# Patient Record
Sex: MALE | Race: WHITE | Employment: OTHER | URBAN - METROPOLITAN AREA
[De-identification: names, ages, dates, MRNs, and addresses within clinical notes are randomized per-mention and may not be internally consistent; named-entity substitution may affect disease eponyms.]

---

## 2018-01-24 PROBLEM — J44.1 COPD EXACERBATION (HCC): Status: ACTIVE | Noted: 2018-01-24

## 2018-03-01 ENCOUNTER — PAT TELEPHONE (OUTPATIENT)
Dept: PREADMISSION TESTING | Age: 83
End: 2018-03-01

## 2018-03-01 VITALS — BODY MASS INDEX: 33.04 KG/M2 | HEIGHT: 71 IN | WEIGHT: 236 LBS

## 2018-03-01 RX ORDER — ALBUTEROL SULFATE 2.5 MG/3ML
2.5 SOLUTION RESPIRATORY (INHALATION) EVERY 6 HOURS PRN
COMMUNITY

## 2018-03-01 RX ORDER — ALBUTEROL SULFATE 90 UG/1
1 AEROSOL, METERED RESPIRATORY (INHALATION)
COMMUNITY
Start: 2017-07-20 | End: 2018-03-01 | Stop reason: CLARIF

## 2018-03-01 RX ORDER — DOCUSATE SODIUM 100 MG/1
100 CAPSULE, LIQUID FILLED ORAL 2 TIMES DAILY
COMMUNITY

## 2018-03-01 NOTE — PRE-PROCEDURE INSTRUCTIONS
[address] [facility] time____900________        Surgery time___1030_________    Take the following medications with a sip of water:see epic    Do not eat or drink anything after 12:00 midnight prior to your surgery. This includes water chewing gum, mints and ice chips. You may brush your teeth and gargle the morning of your surgery, but do not swallow the water     Please see your family doctor/pediatrician for a history and physical and/or concerning medications. Bring any test results/reports from your physicians office. If you are under the care of a heart doctor or specialist doctor, please be aware that you may be asked to them for clearance    You may be asked to stop blood thinners such as Coumadin, Plavix, Fragmin, Lovenox, etc., or any anti-inflammatories such as:  Aspirin, Ibuprofen, Advil, Naproxen prior to your surgery. We also ask that you stop any OTC medications such as fish oil, vitamin E, glucosamine, garlic, Multivitamins, COQ 10, etc.    We ask that you do not smoke 24 hours prior to surgery  We ask that you do not  drink any alcoholic beverages 24 hours prior to surgery     You must make arrangements for a responsible adult to take you home after your surgery. For your safety you will not be allowed to leave alone or drive yourself home. Your surgery will be cancelled if you do not have a ride home. Also for your safety, it is strongly suggested that someone stay with you the first 24 hours after your surgery. A parent or legal guardian must accompany a child scheduled for surgery and plan to stay at the hospital until the child is discharged. Please do not bring other children with you. For your comfort, please wear simple loose fitting clothing to the hospital.  Please do not bring valuables.     Do not wear any make-up or nail polish on your fingers or toes      For your safety, please do not wear any jewelry or body piercing's

## 2018-03-07 ENCOUNTER — HOSPITAL ENCOUNTER (OUTPATIENT)
Dept: ENDOSCOPY | Age: 83
Discharge: HOME | End: 2018-03-07
Attending: INTERNAL MEDICINE | Admitting: INTERNAL MEDICINE

## 2018-03-07 VITALS
WEIGHT: 239.64 LBS | HEART RATE: 88 BPM | SYSTOLIC BLOOD PRESSURE: 116 MMHG | TEMPERATURE: 98 F | DIASTOLIC BLOOD PRESSURE: 74 MMHG | BODY MASS INDEX: 33.9 KG/M2 | RESPIRATION RATE: 14 BRPM | OXYGEN SATURATION: 98 %

## 2018-03-07 LAB
ANION GAP SERPL CALCULATED.3IONS-SCNC: 16 MMOL/L (ref 3–16)
BUN BLDV-MCNC: 18 MG/DL (ref 7–20)
CALCIUM SERPL-MCNC: 9.1 MG/DL (ref 8.3–10.6)
CHLORIDE BLD-SCNC: 100 MMOL/L (ref 99–110)
CO2: 24 MMOL/L (ref 21–32)
CREAT SERPL-MCNC: 1 MG/DL (ref 0.8–1.3)
GFR AFRICAN AMERICAN: >60
GFR NON-AFRICAN AMERICAN: >60
GLUCOSE BLD-MCNC: 153 MG/DL (ref 70–99)
GLUCOSE BLD-MCNC: 180 MG/DL (ref 70–99)
GLUCOSE BLD-MCNC: 181 MG/DL (ref 70–99)
HCT VFR BLD CALC: 39 % (ref 40.5–52.5)
HEMOGLOBIN: 13.6 G/DL (ref 13.5–17.5)
INR BLD: 1.06 (ref 0.85–1.15)
MCH RBC QN AUTO: 29.2 PG (ref 26–34)
MCHC RBC AUTO-ENTMCNC: 34.9 G/DL (ref 31–36)
MCV RBC AUTO: 83.8 FL (ref 80–100)
PDW BLD-RTO: 17.1 % (ref 12.4–15.4)
PERFORMED ON: ABNORMAL
PERFORMED ON: ABNORMAL
PLATELET # BLD: 139 K/UL (ref 135–450)
PMV BLD AUTO: 8.9 FL (ref 5–10.5)
POTASSIUM REFLEX MAGNESIUM: 4.4 MMOL/L (ref 3.5–5.1)
PROTHROMBIN TIME: 12 SEC (ref 9.6–13)
RBC # BLD: 4.65 M/UL (ref 4.2–5.9)
SODIUM BLD-SCNC: 140 MMOL/L (ref 136–145)
WBC # BLD: 6.1 K/UL (ref 4–11)

## 2018-03-07 RX ORDER — SODIUM CHLORIDE 9 MG/ML
INJECTION, SOLUTION INTRAVENOUS CONTINUOUS
Status: DISCONTINUED | OUTPATIENT
Start: 2018-03-07 | End: 2018-03-08 | Stop reason: HOSPADM

## 2018-03-07 RX ORDER — MEPERIDINE HYDROCHLORIDE 25 MG/ML
12.5 INJECTION INTRAMUSCULAR; INTRAVENOUS; SUBCUTANEOUS EVERY 5 MIN PRN
Status: DISCONTINUED | OUTPATIENT
Start: 2018-03-07 | End: 2018-03-08 | Stop reason: HOSPADM

## 2018-03-07 RX ORDER — METOPROLOL SUCCINATE 25 MG/1
100 TABLET, EXTENDED RELEASE ORAL DAILY
Status: DISCONTINUED | OUTPATIENT
Start: 2018-03-07 | End: 2018-03-08 | Stop reason: HOSPADM

## 2018-03-07 RX ORDER — OXYCODONE HYDROCHLORIDE 5 MG/1
10 TABLET ORAL PRN
Status: ACTIVE | OUTPATIENT
Start: 2018-03-07 | End: 2018-03-07

## 2018-03-07 RX ORDER — ONDANSETRON 2 MG/ML
4 INJECTION INTRAMUSCULAR; INTRAVENOUS
Status: ACTIVE | OUTPATIENT
Start: 2018-03-07 | End: 2018-03-07

## 2018-03-07 RX ORDER — MORPHINE SULFATE 2 MG/ML
1 INJECTION, SOLUTION INTRAMUSCULAR; INTRAVENOUS EVERY 5 MIN PRN
Status: DISCONTINUED | OUTPATIENT
Start: 2018-03-07 | End: 2018-03-08 | Stop reason: HOSPADM

## 2018-03-07 RX ORDER — METOPROLOL SUCCINATE 25 MG/1
25 TABLET, EXTENDED RELEASE ORAL DAILY
Status: DISCONTINUED | OUTPATIENT
Start: 2018-03-07 | End: 2018-03-07

## 2018-03-07 RX ORDER — FENTANYL CITRATE 50 UG/ML
25 INJECTION, SOLUTION INTRAMUSCULAR; INTRAVENOUS EVERY 5 MIN PRN
Status: DISCONTINUED | OUTPATIENT
Start: 2018-03-07 | End: 2018-03-08 | Stop reason: HOSPADM

## 2018-03-07 RX ORDER — FENTANYL CITRATE 50 UG/ML
50 INJECTION, SOLUTION INTRAMUSCULAR; INTRAVENOUS EVERY 5 MIN PRN
Status: DISCONTINUED | OUTPATIENT
Start: 2018-03-07 | End: 2018-03-08 | Stop reason: HOSPADM

## 2018-03-07 RX ORDER — MORPHINE SULFATE 2 MG/ML
2 INJECTION, SOLUTION INTRAMUSCULAR; INTRAVENOUS EVERY 5 MIN PRN
Status: DISCONTINUED | OUTPATIENT
Start: 2018-03-07 | End: 2018-03-08 | Stop reason: HOSPADM

## 2018-03-07 RX ORDER — SODIUM CHLORIDE 0.9 % (FLUSH) 0.9 %
10 SYRINGE (ML) INJECTION PRN
Status: DISCONTINUED | OUTPATIENT
Start: 2018-03-07 | End: 2018-03-08 | Stop reason: HOSPADM

## 2018-03-07 RX ORDER — OXYCODONE HYDROCHLORIDE 5 MG/1
5 TABLET ORAL PRN
Status: ACTIVE | OUTPATIENT
Start: 2018-03-07 | End: 2018-03-07

## 2018-03-07 RX ORDER — SODIUM CHLORIDE 0.9 % (FLUSH) 0.9 %
10 SYRINGE (ML) INJECTION EVERY 12 HOURS SCHEDULED
Status: DISCONTINUED | OUTPATIENT
Start: 2018-03-07 | End: 2018-03-08 | Stop reason: HOSPADM

## 2018-03-07 RX ADMIN — SODIUM CHLORIDE: 9 INJECTION, SOLUTION INTRAVENOUS at 09:45

## 2018-03-07 ASSESSMENT — LIFESTYLE VARIABLES: SMOKING_STATUS: 0

## 2018-03-07 ASSESSMENT — PAIN - FUNCTIONAL ASSESSMENT: PAIN_FUNCTIONAL_ASSESSMENT: 0-10

## 2018-03-07 ASSESSMENT — PAIN SCALES - GENERAL
PAINLEVEL_OUTOF10: 0
PAINLEVEL_OUTOF10: 0

## 2018-03-07 ASSESSMENT — ENCOUNTER SYMPTOMS: SHORTNESS OF BREATH: 0

## 2018-03-07 NOTE — H&P
Take 20 mg by mouth nightly       tamsulosin (FLOMAX) 0.4 MG capsule Take 0.4 mg by mouth nightly       warfarin (COUMADIN) 5 MG tablet Take 7.5 mg by mouth every evening Except 10 mg MWF      ferrous sulfate 325 (65 FE) MG tablet Take 325 mg by mouth daily (with breakfast)       zolpidem (AMBIEN) 5 MG tablet Take 5 mg by mouth nightly as needed for Sleep      ONETOUCH DELICA LANCETS 61V MISC by Does not apply route       No current facility-administered medications on file prior to encounter. Allergies:  No known allergies    Social History:      Social History     Social History    Marital status:      Spouse name: N/A    Number of children: N/A    Years of education: N/A     Occupational History    Not on file. Social History Main Topics    Smoking status: Former Smoker    Smokeless tobacco: Never Used      Comment: Quit 50 years ago    Alcohol use Yes      Comment: occ     Drug use: No    Sexual activity: Not on file     Other Topics Concern    Not on file     Social History Narrative    No narrative on file           Family History:   History reviewed. No pertinent family history. PHYSICAL EXAM:      /73   Pulse 114   Temp 97.1 °F (36.2 °C) (Temporal)   Resp 20   Wt 239 lb 10.2 oz (108.7 kg)   SpO2 99%   BMI 33.90 kg/m²  I        Heart: Normal    Lungs: Normal    Abdomen: Normal      ASA Grade: ASA 3 - Patient with moderate systemic disease with functional limitations    II (soft palate, uvula, fauces visible)  ASSESSMENT AND PLAN:    1. Patient is a 80 y.o. male here for Colonoscopy  2. Procedure options, risks and benefits reviewed with patient who expresses understanding.

## 2018-03-07 NOTE — PROGRESS NOTES
1041--Pt admitted to PACU from [facility]. Monitors placed. O2 on t 3l/nc. Asleep. Abd soft. Passing flatus per rectum.

## 2018-03-07 NOTE — ANESTHESIA PRE-OP
(ALDACTONE) 50 MG tablet Take 50 mg by mouth daily      potassium chloride (MICRO-K) 10 MEQ extended release capsule Take 20 mEq by mouth 2 times daily      folic acid (FOLVITE) 1 MG tablet Take 1 mg by mouth daily      mometasone-formoterol (DULERA) 200-5 MCG/ACT inhaler Inhale 2 puffs into the lungs every 12 hours      atorvastatin (LIPITOR) 20 MG tablet Take 20 mg by mouth nightly       tamsulosin (FLOMAX) 0.4 MG capsule Take 0.4 mg by mouth nightly       warfarin (COUMADIN) 5 MG tablet Take 7.5 mg by mouth every evening Except 10 mg MWF      ferrous sulfate 325 (65 FE) MG tablet Take 325 mg by mouth daily (with breakfast)       zolpidem (AMBIEN) 5 MG tablet Take 5 mg by mouth nightly as needed for Sleep      ONETOUCH DELICA LANCETS 71F MISC by Does not apply route       No current facility-administered medications on file prior to encounter.       Current Outpatient Prescriptions   Medication Sig Dispense Refill    Enoxaparin Sodium (LOVENOX IJ) Inject as directed      SITagliptin (JANUVIA) 50 MG tablet Take 50 mg by mouth daily      docusate sodium (COLACE) 100 MG capsule Take 100 mg by mouth 2 times daily      albuterol (PROVENTIL) (2.5 MG/3ML) 0.083% nebulizer solution Take 2.5 mg by nebulization every 6 hours as needed for Wheezing      predniSONE (DELTASONE) 10 MG tablet 4 tabs daily x 2 days, then 2 tabs daily x 2 days, then 1 tab daily x 2 days, then STOP 14 tablet 0    metoprolol succinate (TOPROL XL) 100 MG extended release tablet Take 100 mg by mouth daily      Probiotic Product (PROBIOTIC-10 PO) Take 1 tablet by mouth daily      torsemide (DEMADEX) 20 MG tablet Take 40 mg by mouth daily       spironolactone (ALDACTONE) 50 MG tablet Take 50 mg by mouth daily      potassium chloride (MICRO-K) 10 MEQ extended release capsule Take 20 mEq by mouth 2 times daily      folic acid (FOLVITE) 1 MG tablet Take 1 mg by mouth daily      mometasone-formoterol (DULERA) 200-5 MCG/ACT inhaler Inhale 2 puffs into the lungs every 12 hours      atorvastatin (LIPITOR) 20 MG tablet Take 20 mg by mouth nightly       tamsulosin (FLOMAX) 0.4 MG capsule Take 0.4 mg by mouth nightly       warfarin (COUMADIN) 5 MG tablet Take 7.5 mg by mouth every evening Except 10 mg MWF      ferrous sulfate 325 (65 FE) MG tablet Take 325 mg by mouth daily (with breakfast)       zolpidem (AMBIEN) 5 MG tablet Take 5 mg by mouth nightly as needed for Sleep      ONETOUCH DELICA LANCETS 31W MISC by Does not apply route       Current Facility-Administered Medications   Medication Dose Route Frequency Provider Last Rate Last Dose    0.9 % sodium chloride infusion   Intravenous Continuous Micahel Peña MD        sodium chloride flush 0.9 % injection 10 mL  10 mL Intravenous 2 times per day Michael Peña MD        sodium chloride flush 0.9 % injection 10 mL  10 mL Intravenous PRN Michael Peña MD        famotidine (PEPCID) injection 20 mg  20 mg Intravenous Once Michael Peña MD         Vital Signs (Current)   Vitals:    18   BP: 134/73   Pulse: 114   Resp: 20   Temp: 97.1 °F (36.2 °C)   SpO2: 99%     Vital Signs Statistics (for past 48 hrs)     Temp  Av.1 °F (36.2 °C)  Min: 97.1 °F (36.2 °C)   Min taken time: 18  Max: 97.1 °F (36.2 °C)   Max taken time: 18  Pulse  Av  Min: 114   Min taken time: 18  Max: 114   Max taken time: 18  Resp  Av  Min: 20   Min taken time: 18  Max: 20   Max taken time: 18  BP  Min: 134/73   Min taken time: 18  Max: 134/73   Max taken time: 18  SpO2  Av %  Min: 99 %   Min taken time: 18  Max: 99 %   Max taken time: 18    BP Readings from Last 3 Encounters:   18 134/73   18 130/73   17 103/73     BMI  There is no height or weight on file to calculate BMI.   Estimated body mass index is 33.38 kg/m² as calculated from the following:    Height as of 3/1/18: 5' 10.5\" (1.791 m). Weight as of 3/1/18: 236 lb (107 kg). CBC   Lab Results   Component Value Date    WBC 10.2 01/27/2018    RBC 4.03 01/27/2018    HGB 11.7 01/27/2018    HCT 34.6 01/27/2018    MCV 85.9 01/27/2018    RDW 16.1 01/27/2018     01/27/2018     CMP    Lab Results   Component Value Date     01/27/2018    K 4.4 01/27/2018    K 4.1 01/24/2018     01/27/2018    CO2 24 01/27/2018    BUN 35 01/27/2018    CREATININE 1.1 01/27/2018    GFRAA >60 01/27/2018    GFRAA >60 12/20/2012    AGRATIO 1.5 06/04/2017    LABGLOM >60 01/27/2018    GLUCOSE 179 01/27/2018    PROT 7.5 06/04/2017    PROT 6.7 12/20/2012    CALCIUM 8.9 01/27/2018    BILITOT 0.5 06/04/2017    ALKPHOS 89 06/04/2017    AST 24 06/04/2017    ALT 20 06/04/2017     BMP    Lab Results   Component Value Date     01/27/2018    K 4.4 01/27/2018    K 4.1 01/24/2018     01/27/2018    CO2 24 01/27/2018    BUN 35 01/27/2018    CREATININE 1.1 01/27/2018    CALCIUM 8.9 01/27/2018    GFRAA >60 01/27/2018    GFRAA >60 12/20/2012    LABGLOM >60 01/27/2018    GLUCOSE 179 01/27/2018     POCGlucose  No results for input(s): GLUCOSE in the last 72 hours.    Coags    Lab Results   Component Value Date    PROTIME 25.6 01/27/2018    INR 2.27 01/27/2018    APTT 28.6 96/85/0459     HCG (If Applicable) No results found for: PREGTESTUR, PREGSERUM, HCG, HCGQUANT   ABGs No results found for: PHART, PO2ART, EDF9RVJ, ADA5JTW, BEART, K4KHTRAD   Type & Screen (If Applicable)  No results found for: LABABO, LABRH                         BMI: Wt Readings from Last 3 Encounters:       NPO Status:   Date of last liquid consumption: 03/07/18   Time of last liquid consumption: 0530   Date of last solid food consumption: 03/05/18              Anesthesia Evaluation  Patient summary reviewed and Nursing notes reviewed no history of anesthetic complications:   Airway: Mallampati: II  TM distance: >3 FB   Neck ROM: full  Mouth opening: > = 3 FB Dental:

## 2019-12-14 ENCOUNTER — APPOINTMENT (OUTPATIENT)
Dept: GENERAL RADIOLOGY | Age: 84
DRG: 315 | End: 2019-12-14
Payer: MEDICARE

## 2019-12-14 ENCOUNTER — HOSPITAL ENCOUNTER (INPATIENT)
Age: 84
LOS: 2 days | Discharge: HOME | DRG: 315 | End: 2019-12-17
Attending: EMERGENCY MEDICINE | Admitting: HOSPITALIST
Payer: MEDICARE

## 2019-12-14 DIAGNOSIS — R06.09 EXERTIONAL DYSPNEA: ICD-10-CM

## 2019-12-14 DIAGNOSIS — I24.9 CORONARY SYNDROME, ACUTE (HCC): Primary | ICD-10-CM

## 2019-12-14 DIAGNOSIS — N17.9 AKI (ACUTE KIDNEY INJURY) (HCC): ICD-10-CM

## 2019-12-14 PROBLEM — R07.9 CHEST PAIN: Status: ACTIVE | Noted: 2019-12-14

## 2019-12-14 LAB
ANION GAP SERPL CALCULATED.3IONS-SCNC: 14 MMOL/L (ref 3–16)
BASOPHILS ABSOLUTE: 0 K/UL (ref 0–0.2)
BASOPHILS RELATIVE PERCENT: 0.5 %
BUN BLDV-MCNC: 33 MG/DL (ref 7–20)
CALCIUM SERPL-MCNC: 9.4 MG/DL (ref 8.3–10.6)
CHLORIDE BLD-SCNC: 104 MMOL/L (ref 99–110)
CO2: 23 MMOL/L (ref 21–32)
CREAT SERPL-MCNC: 1.6 MG/DL (ref 0.8–1.3)
EOSINOPHILS ABSOLUTE: 0.1 K/UL (ref 0–0.6)
EOSINOPHILS RELATIVE PERCENT: 0.7 %
GFR AFRICAN AMERICAN: 50
GFR NON-AFRICAN AMERICAN: 41
GLUCOSE BLD-MCNC: 180 MG/DL (ref 70–99)
HCT VFR BLD CALC: 40.6 % (ref 40.5–52.5)
HEMOGLOBIN: 13.6 G/DL (ref 13.5–17.5)
LYMPHOCYTES ABSOLUTE: 0.9 K/UL (ref 1–5.1)
LYMPHOCYTES RELATIVE PERCENT: 10.1 %
MCH RBC QN AUTO: 28.9 PG (ref 26–34)
MCHC RBC AUTO-ENTMCNC: 33.6 G/DL (ref 31–36)
MCV RBC AUTO: 86.2 FL (ref 80–100)
MONOCYTES ABSOLUTE: 0.7 K/UL (ref 0–1.3)
MONOCYTES RELATIVE PERCENT: 7.5 %
NEUTROPHILS ABSOLUTE: 7.2 K/UL (ref 1.7–7.7)
NEUTROPHILS RELATIVE PERCENT: 81.2 %
PDW BLD-RTO: 15.3 % (ref 12.4–15.4)
PLATELET # BLD: 134 K/UL (ref 135–450)
PMV BLD AUTO: 9.5 FL (ref 5–10.5)
POTASSIUM REFLEX MAGNESIUM: 5.1 MMOL/L (ref 3.5–5.1)
PRO-BNP: 702 PG/ML (ref 0–449)
RBC # BLD: 4.71 M/UL (ref 4.2–5.9)
SODIUM BLD-SCNC: 141 MMOL/L (ref 136–145)
TROPONIN: <0.01 NG/ML
TROPONIN: <0.01 NG/ML
WBC # BLD: 8.9 K/UL (ref 4–11)

## 2019-12-14 PROCEDURE — 83880 ASSAY OF NATRIURETIC PEPTIDE: CPT

## 2019-12-14 PROCEDURE — 36415 COLL VENOUS BLD VENIPUNCTURE: CPT

## 2019-12-14 PROCEDURE — 71046 X-RAY EXAM CHEST 2 VIEWS: CPT

## 2019-12-14 PROCEDURE — 2580000003 HC RX 258: Performed by: HOSPITALIST

## 2019-12-14 PROCEDURE — 93005 ELECTROCARDIOGRAM TRACING: CPT | Performed by: EMERGENCY MEDICINE

## 2019-12-14 PROCEDURE — 6370000000 HC RX 637 (ALT 250 FOR IP): Performed by: HOSPITALIST

## 2019-12-14 PROCEDURE — 80048 BASIC METABOLIC PNL TOTAL CA: CPT

## 2019-12-14 PROCEDURE — G0378 HOSPITAL OBSERVATION PER HR: HCPCS

## 2019-12-14 PROCEDURE — 99285 EMERGENCY DEPT VISIT HI MDM: CPT

## 2019-12-14 PROCEDURE — 96374 THER/PROPH/DIAG INJ IV PUSH: CPT

## 2019-12-14 PROCEDURE — 6360000002 HC RX W HCPCS: Performed by: HOSPITALIST

## 2019-12-14 PROCEDURE — 85025 COMPLETE CBC W/AUTO DIFF WBC: CPT

## 2019-12-14 PROCEDURE — 84484 ASSAY OF TROPONIN QUANT: CPT

## 2019-12-14 PROCEDURE — 83036 HEMOGLOBIN GLYCOSYLATED A1C: CPT

## 2019-12-14 RX ORDER — DEXTROSE MONOHYDRATE 25 G/50ML
12.5 INJECTION, SOLUTION INTRAVENOUS PRN
Status: DISCONTINUED | OUTPATIENT
Start: 2019-12-14 | End: 2019-12-17 | Stop reason: HOSPADM

## 2019-12-14 RX ORDER — SPIRONOLACTONE 25 MG/1
50 TABLET ORAL DAILY
Status: CANCELLED | OUTPATIENT
Start: 2019-12-14

## 2019-12-14 RX ORDER — TAMSULOSIN HYDROCHLORIDE 0.4 MG/1
0.4 CAPSULE ORAL NIGHTLY
Status: DISCONTINUED | OUTPATIENT
Start: 2019-12-14 | End: 2019-12-17 | Stop reason: HOSPADM

## 2019-12-14 RX ORDER — METOPROLOL SUCCINATE 50 MG/1
100 TABLET, EXTENDED RELEASE ORAL DAILY
Status: DISCONTINUED | OUTPATIENT
Start: 2019-12-15 | End: 2019-12-14

## 2019-12-14 RX ORDER — NICOTINE POLACRILEX 4 MG
15 LOZENGE BUCCAL PRN
Status: DISCONTINUED | OUTPATIENT
Start: 2019-12-14 | End: 2019-12-17 | Stop reason: HOSPADM

## 2019-12-14 RX ORDER — FERROUS SULFATE TAB EC 324 MG (65 MG FE EQUIVALENT) 324 (65 FE) MG
325 TABLET DELAYED RESPONSE ORAL
Status: DISCONTINUED | OUTPATIENT
Start: 2019-12-15 | End: 2019-12-17 | Stop reason: HOSPADM

## 2019-12-14 RX ORDER — METOPROLOL SUCCINATE 50 MG/1
200 TABLET, EXTENDED RELEASE ORAL DAILY
Status: DISCONTINUED | OUTPATIENT
Start: 2019-12-15 | End: 2019-12-17 | Stop reason: HOSPADM

## 2019-12-14 RX ORDER — SODIUM CHLORIDE 0.9 % (FLUSH) 0.9 %
10 SYRINGE (ML) INJECTION PRN
Status: DISCONTINUED | OUTPATIENT
Start: 2019-12-14 | End: 2019-12-17 | Stop reason: HOSPADM

## 2019-12-14 RX ORDER — SODIUM CHLORIDE 0.9 % (FLUSH) 0.9 %
10 SYRINGE (ML) INJECTION EVERY 12 HOURS SCHEDULED
Status: DISCONTINUED | OUTPATIENT
Start: 2019-12-14 | End: 2019-12-17 | Stop reason: HOSPADM

## 2019-12-14 RX ORDER — ZOLPIDEM TARTRATE 5 MG/1
5 TABLET ORAL NIGHTLY PRN
Status: DISCONTINUED | OUTPATIENT
Start: 2019-12-14 | End: 2019-12-14 | Stop reason: ALTCHOICE

## 2019-12-14 RX ORDER — ASPIRIN 81 MG/1
81 TABLET, CHEWABLE ORAL DAILY
Status: DISCONTINUED | OUTPATIENT
Start: 2019-12-14 | End: 2019-12-17 | Stop reason: HOSPADM

## 2019-12-14 RX ORDER — DEXTROSE MONOHYDRATE 50 MG/ML
100 INJECTION, SOLUTION INTRAVENOUS PRN
Status: DISCONTINUED | OUTPATIENT
Start: 2019-12-14 | End: 2019-12-17 | Stop reason: HOSPADM

## 2019-12-14 RX ORDER — ALBUTEROL SULFATE 2.5 MG/3ML
2.5 SOLUTION RESPIRATORY (INHALATION) EVERY 6 HOURS PRN
Status: DISCONTINUED | OUTPATIENT
Start: 2019-12-14 | End: 2019-12-17 | Stop reason: HOSPADM

## 2019-12-14 RX ORDER — WARFARIN SODIUM 7.5 MG/1
7.5 TABLET ORAL EVERY EVENING
Status: DISCONTINUED | OUTPATIENT
Start: 2019-12-14 | End: 2019-12-14

## 2019-12-14 RX ORDER — FOLIC ACID 1 MG/1
1 TABLET ORAL DAILY
Status: DISCONTINUED | OUTPATIENT
Start: 2019-12-15 | End: 2019-12-17 | Stop reason: HOSPADM

## 2019-12-14 RX ORDER — DOCUSATE SODIUM 100 MG/1
100 CAPSULE, LIQUID FILLED ORAL 2 TIMES DAILY
Status: DISCONTINUED | OUTPATIENT
Start: 2019-12-14 | End: 2019-12-17 | Stop reason: HOSPADM

## 2019-12-14 RX ORDER — FAMOTIDINE 20 MG/1
20 TABLET, FILM COATED ORAL DAILY
Status: DISCONTINUED | OUTPATIENT
Start: 2019-12-15 | End: 2019-12-17 | Stop reason: HOSPADM

## 2019-12-14 RX ORDER — ATORVASTATIN CALCIUM 20 MG/1
20 TABLET, FILM COATED ORAL NIGHTLY
Status: DISCONTINUED | OUTPATIENT
Start: 2019-12-14 | End: 2019-12-17 | Stop reason: HOSPADM

## 2019-12-14 RX ORDER — POTASSIUM CHLORIDE 750 MG/1
20 TABLET, FILM COATED, EXTENDED RELEASE ORAL 2 TIMES DAILY
Status: DISCONTINUED | OUTPATIENT
Start: 2019-12-14 | End: 2019-12-17 | Stop reason: HOSPADM

## 2019-12-14 RX ORDER — FUROSEMIDE 10 MG/ML
40 INJECTION INTRAMUSCULAR; INTRAVENOUS 2 TIMES DAILY
Status: DISCONTINUED | OUTPATIENT
Start: 2019-12-14 | End: 2019-12-15

## 2019-12-14 RX ORDER — ONDANSETRON 2 MG/ML
4 INJECTION INTRAMUSCULAR; INTRAVENOUS EVERY 6 HOURS PRN
Status: DISCONTINUED | OUTPATIENT
Start: 2019-12-14 | End: 2019-12-17 | Stop reason: HOSPADM

## 2019-12-14 RX ADMIN — DOCUSATE SODIUM 100 MG: 100 CAPSULE, LIQUID FILLED ORAL at 23:40

## 2019-12-14 RX ADMIN — FUROSEMIDE 40 MG: 10 INJECTION, SOLUTION INTRAMUSCULAR; INTRAVENOUS at 20:50

## 2019-12-14 RX ADMIN — SODIUM CHLORIDE, PRESERVATIVE FREE 10 ML: 5 INJECTION INTRAVENOUS at 23:44

## 2019-12-14 RX ADMIN — TAMSULOSIN HYDROCHLORIDE 0.4 MG: 0.4 CAPSULE ORAL at 23:41

## 2019-12-14 RX ADMIN — ATORVASTATIN CALCIUM 20 MG: 20 TABLET, FILM COATED ORAL at 23:41

## 2019-12-14 RX ADMIN — POTASSIUM CHLORIDE 20 MEQ: 750 TABLET, FILM COATED, EXTENDED RELEASE ORAL at 23:41

## 2019-12-14 ASSESSMENT — PAIN SCALES - GENERAL
PAINLEVEL_OUTOF10: 0

## 2019-12-15 PROBLEM — I50.33 ACUTE ON CHRONIC DIASTOLIC CHF (CONGESTIVE HEART FAILURE) (HCC): Status: ACTIVE | Noted: 2019-12-15

## 2019-12-15 LAB
A/G RATIO: 1.6 (ref 1.1–2.2)
ALBUMIN SERPL-MCNC: 4 G/DL (ref 3.4–5)
ALP BLD-CCNC: 69 U/L (ref 40–129)
ALT SERPL-CCNC: 35 U/L (ref 10–40)
ANION GAP SERPL CALCULATED.3IONS-SCNC: 14 MMOL/L (ref 3–16)
AST SERPL-CCNC: 27 U/L (ref 15–37)
BILIRUB SERPL-MCNC: 0.7 MG/DL (ref 0–1)
BUN BLDV-MCNC: 32 MG/DL (ref 7–20)
CALCIUM SERPL-MCNC: 8.8 MG/DL (ref 8.3–10.6)
CHLORIDE BLD-SCNC: 100 MMOL/L (ref 99–110)
CHOLESTEROL, TOTAL: 69 MG/DL (ref 0–199)
CO2: 26 MMOL/L (ref 21–32)
CREAT SERPL-MCNC: 1.3 MG/DL (ref 0.8–1.3)
ESTIMATED AVERAGE GLUCOSE: 151.3 MG/DL
ESTIMATED AVERAGE GLUCOSE: 154.2 MG/DL
GFR AFRICAN AMERICAN: >60
GFR NON-AFRICAN AMERICAN: 52
GLOBULIN: 2.5 G/DL
GLUCOSE BLD-MCNC: 141 MG/DL (ref 70–99)
GLUCOSE BLD-MCNC: 146 MG/DL (ref 70–99)
GLUCOSE BLD-MCNC: 154 MG/DL (ref 70–99)
GLUCOSE BLD-MCNC: 160 MG/DL (ref 70–99)
GLUCOSE BLD-MCNC: 186 MG/DL (ref 70–99)
HBA1C MFR BLD: 6.9 %
HBA1C MFR BLD: 7 %
HCT VFR BLD CALC: 38.4 % (ref 40.5–52.5)
HDLC SERPL-MCNC: 35 MG/DL (ref 40–60)
HEMOGLOBIN: 13.1 G/DL (ref 13.5–17.5)
INR BLD: 2.44 (ref 0.86–1.14)
LDL CHOLESTEROL CALCULATED: 9 MG/DL
MAGNESIUM: 2.4 MG/DL (ref 1.8–2.4)
MCH RBC QN AUTO: 29.2 PG (ref 26–34)
MCHC RBC AUTO-ENTMCNC: 34.1 G/DL (ref 31–36)
MCV RBC AUTO: 85.5 FL (ref 80–100)
PDW BLD-RTO: 15.7 % (ref 12.4–15.4)
PERFORMED ON: ABNORMAL
PLATELET # BLD: 121 K/UL (ref 135–450)
PMV BLD AUTO: 9.5 FL (ref 5–10.5)
POTASSIUM REFLEX MAGNESIUM: 4.1 MMOL/L (ref 3.5–5.1)
PROTHROMBIN TIME: 28.6 SEC (ref 10–13.2)
RBC # BLD: 4.49 M/UL (ref 4.2–5.9)
SODIUM BLD-SCNC: 140 MMOL/L (ref 136–145)
TOTAL PROTEIN: 6.5 G/DL (ref 6.4–8.2)
TRIGL SERPL-MCNC: 123 MG/DL (ref 0–150)
TROPONIN: <0.01 NG/ML
VLDLC SERPL CALC-MCNC: 25 MG/DL
WBC # BLD: 8.4 K/UL (ref 4–11)

## 2019-12-15 PROCEDURE — 99223 1ST HOSP IP/OBS HIGH 75: CPT | Performed by: INTERNAL MEDICINE

## 2019-12-15 PROCEDURE — 6370000000 HC RX 637 (ALT 250 FOR IP): Performed by: NURSE PRACTITIONER

## 2019-12-15 PROCEDURE — 6360000002 HC RX W HCPCS: Performed by: INTERNAL MEDICINE

## 2019-12-15 PROCEDURE — 6370000000 HC RX 637 (ALT 250 FOR IP): Performed by: INTERNAL MEDICINE

## 2019-12-15 PROCEDURE — 84484 ASSAY OF TROPONIN QUANT: CPT

## 2019-12-15 PROCEDURE — 80061 LIPID PANEL: CPT

## 2019-12-15 PROCEDURE — 83735 ASSAY OF MAGNESIUM: CPT

## 2019-12-15 PROCEDURE — 2580000003 HC RX 258: Performed by: HOSPITALIST

## 2019-12-15 PROCEDURE — G0378 HOSPITAL OBSERVATION PER HR: HCPCS

## 2019-12-15 PROCEDURE — 80053 COMPREHEN METABOLIC PANEL: CPT

## 2019-12-15 PROCEDURE — 1200000000 HC SEMI PRIVATE

## 2019-12-15 PROCEDURE — 85610 PROTHROMBIN TIME: CPT

## 2019-12-15 PROCEDURE — 94760 N-INVAS EAR/PLS OXIMETRY 1: CPT

## 2019-12-15 PROCEDURE — 93005 ELECTROCARDIOGRAM TRACING: CPT | Performed by: HOSPITALIST

## 2019-12-15 PROCEDURE — 85027 COMPLETE CBC AUTOMATED: CPT

## 2019-12-15 PROCEDURE — 97161 PT EVAL LOW COMPLEX 20 MIN: CPT

## 2019-12-15 PROCEDURE — 36415 COLL VENOUS BLD VENIPUNCTURE: CPT

## 2019-12-15 PROCEDURE — 94664 DEMO&/EVAL PT USE INHALER: CPT

## 2019-12-15 PROCEDURE — 83036 HEMOGLOBIN GLYCOSYLATED A1C: CPT

## 2019-12-15 PROCEDURE — 6370000000 HC RX 637 (ALT 250 FOR IP): Performed by: HOSPITALIST

## 2019-12-15 PROCEDURE — 94640 AIRWAY INHALATION TREATMENT: CPT

## 2019-12-15 RX ORDER — WARFARIN SODIUM 7.5 MG/1
7.5 TABLET ORAL
Status: COMPLETED | OUTPATIENT
Start: 2019-12-15 | End: 2019-12-15

## 2019-12-15 RX ORDER — FUROSEMIDE 10 MG/ML
60 INJECTION INTRAMUSCULAR; INTRAVENOUS 2 TIMES DAILY
Status: DISCONTINUED | OUTPATIENT
Start: 2019-12-15 | End: 2019-12-16

## 2019-12-15 RX ADMIN — ATORVASTATIN CALCIUM 20 MG: 20 TABLET, FILM COATED ORAL at 20:15

## 2019-12-15 RX ADMIN — DOCUSATE SODIUM 100 MG: 100 CAPSULE, LIQUID FILLED ORAL at 09:32

## 2019-12-15 RX ADMIN — SODIUM CHLORIDE, PRESERVATIVE FREE 10 ML: 5 INJECTION INTRAVENOUS at 09:28

## 2019-12-15 RX ADMIN — POTASSIUM CHLORIDE 20 MEQ: 750 TABLET, FILM COATED, EXTENDED RELEASE ORAL at 09:31

## 2019-12-15 RX ADMIN — MOMETASONE FUROATE AND FORMOTEROL FUMARATE DIHYDRATE 2 PUFF: 200; 5 AEROSOL RESPIRATORY (INHALATION) at 20:36

## 2019-12-15 RX ADMIN — FAMOTIDINE 20 MG: 20 TABLET ORAL at 09:32

## 2019-12-15 RX ADMIN — FUROSEMIDE 60 MG: 10 INJECTION, SOLUTION INTRAMUSCULAR; INTRAVENOUS at 09:32

## 2019-12-15 RX ADMIN — POTASSIUM CHLORIDE 20 MEQ: 750 TABLET, FILM COATED, EXTENDED RELEASE ORAL at 20:15

## 2019-12-15 RX ADMIN — INSULIN LISPRO 1 UNITS: 100 INJECTION, SOLUTION INTRAVENOUS; SUBCUTANEOUS at 17:07

## 2019-12-15 RX ADMIN — FERROUS SULFATE TAB EC 324 MG (65 MG FE EQUIVALENT) 325 MG: 324 (65 FE) TABLET DELAYED RESPONSE at 09:32

## 2019-12-15 RX ADMIN — INSULIN LISPRO 1 UNITS: 100 INJECTION, SOLUTION INTRAVENOUS; SUBCUTANEOUS at 12:52

## 2019-12-15 RX ADMIN — INSULIN LISPRO 1 UNITS: 100 INJECTION, SOLUTION INTRAVENOUS; SUBCUTANEOUS at 09:32

## 2019-12-15 RX ADMIN — WARFARIN SODIUM 7.5 MG: 7.5 TABLET ORAL at 17:07

## 2019-12-15 RX ADMIN — METOPROLOL SUCCINATE 200 MG: 50 TABLET, EXTENDED RELEASE ORAL at 09:32

## 2019-12-15 RX ADMIN — ASPIRIN 81 MG 81 MG: 81 TABLET ORAL at 09:32

## 2019-12-15 RX ADMIN — FOLIC ACID 1 MG: 1 TABLET ORAL at 09:32

## 2019-12-15 RX ADMIN — SODIUM CHLORIDE, PRESERVATIVE FREE 10 ML: 5 INJECTION INTRAVENOUS at 20:15

## 2019-12-15 RX ADMIN — MOMETASONE FUROATE AND FORMOTEROL FUMARATE DIHYDRATE 2 PUFF: 200; 5 AEROSOL RESPIRATORY (INHALATION) at 08:01

## 2019-12-15 RX ADMIN — TAMSULOSIN HYDROCHLORIDE 0.4 MG: 0.4 CAPSULE ORAL at 20:15

## 2019-12-15 RX ADMIN — DOCUSATE SODIUM 100 MG: 100 CAPSULE, LIQUID FILLED ORAL at 20:15

## 2019-12-15 RX ADMIN — FUROSEMIDE 60 MG: 10 INJECTION, SOLUTION INTRAMUSCULAR; INTRAVENOUS at 17:07

## 2019-12-15 ASSESSMENT — PAIN SCALES - GENERAL
PAINLEVEL_OUTOF10: 0

## 2019-12-16 LAB
A/G RATIO: 1.6 (ref 1.1–2.2)
ALBUMIN SERPL-MCNC: 4 G/DL (ref 3.4–5)
ALP BLD-CCNC: 69 U/L (ref 40–129)
ALT SERPL-CCNC: 29 U/L (ref 10–40)
ANION GAP SERPL CALCULATED.3IONS-SCNC: 15 MMOL/L (ref 3–16)
AST SERPL-CCNC: 20 U/L (ref 15–37)
BASOPHILS ABSOLUTE: 0 K/UL (ref 0–0.2)
BASOPHILS RELATIVE PERCENT: 0.6 %
BILIRUB SERPL-MCNC: 0.8 MG/DL (ref 0–1)
BUN BLDV-MCNC: 30 MG/DL (ref 7–20)
CALCIUM SERPL-MCNC: 8.5 MG/DL (ref 8.3–10.6)
CHLORIDE BLD-SCNC: 102 MMOL/L (ref 99–110)
CO2: 24 MMOL/L (ref 21–32)
CREAT SERPL-MCNC: 1.3 MG/DL (ref 0.8–1.3)
EKG ATRIAL RATE: 65 BPM
EKG ATRIAL RATE: 76 BPM
EKG DIAGNOSIS: NORMAL
EKG DIAGNOSIS: NORMAL
EKG Q-T INTERVAL: 456 MS
EKG Q-T INTERVAL: 466 MS
EKG QRS DURATION: 146 MS
EKG QRS DURATION: 152 MS
EKG QTC CALCULATION (BAZETT): 492 MS
EKG QTC CALCULATION (BAZETT): 513 MS
EKG R AXIS: 49 DEGREES
EKG R AXIS: 77 DEGREES
EKG T AXIS: -2 DEGREES
EKG T AXIS: 2 DEGREES
EKG VENTRICULAR RATE: 67 BPM
EKG VENTRICULAR RATE: 76 BPM
EOSINOPHILS ABSOLUTE: 0.1 K/UL (ref 0–0.6)
EOSINOPHILS RELATIVE PERCENT: 1.1 %
GFR AFRICAN AMERICAN: >60
GFR NON-AFRICAN AMERICAN: 52
GLOBULIN: 2.5 G/DL
GLUCOSE BLD-MCNC: 130 MG/DL (ref 70–99)
GLUCOSE BLD-MCNC: 149 MG/DL (ref 70–99)
GLUCOSE BLD-MCNC: 152 MG/DL (ref 70–99)
GLUCOSE BLD-MCNC: 197 MG/DL (ref 70–99)
HCT VFR BLD CALC: 38.8 % (ref 40.5–52.5)
HEMOGLOBIN: 13 G/DL (ref 13.5–17.5)
INR BLD: 2.21 (ref 0.86–1.14)
LV EF: 58 %
LV EF: 73 %
LVEF MODALITY: NORMAL
LVEF MODALITY: NORMAL
LYMPHOCYTES ABSOLUTE: 1.2 K/UL (ref 1–5.1)
LYMPHOCYTES RELATIVE PERCENT: 16.2 %
MCH RBC QN AUTO: 28.8 PG (ref 26–34)
MCHC RBC AUTO-ENTMCNC: 33.5 G/DL (ref 31–36)
MCV RBC AUTO: 85.8 FL (ref 80–100)
MONOCYTES ABSOLUTE: 0.7 K/UL (ref 0–1.3)
MONOCYTES RELATIVE PERCENT: 9.8 %
NEUTROPHILS ABSOLUTE: 5.3 K/UL (ref 1.7–7.7)
NEUTROPHILS RELATIVE PERCENT: 72.3 %
PDW BLD-RTO: 15.7 % (ref 12.4–15.4)
PERFORMED ON: ABNORMAL
PLATELET # BLD: 116 K/UL (ref 135–450)
PMV BLD AUTO: 9.4 FL (ref 5–10.5)
POTASSIUM REFLEX MAGNESIUM: 3.8 MMOL/L (ref 3.5–5.1)
PROTHROMBIN TIME: 25.9 SEC (ref 10–13.2)
RBC # BLD: 4.52 M/UL (ref 4.2–5.9)
REPORT: NORMAL
RESPIRATORY PANEL PCR: NORMAL
SODIUM BLD-SCNC: 141 MMOL/L (ref 136–145)
TOTAL PROTEIN: 6.5 G/DL (ref 6.4–8.2)
WBC # BLD: 7.3 K/UL (ref 4–11)

## 2019-12-16 PROCEDURE — 6360000002 HC RX W HCPCS: Performed by: INTERNAL MEDICINE

## 2019-12-16 PROCEDURE — 85025 COMPLETE CBC W/AUTO DIFF WBC: CPT

## 2019-12-16 PROCEDURE — A9502 TC99M TETROFOSMIN: HCPCS | Performed by: INTERNAL MEDICINE

## 2019-12-16 PROCEDURE — 36415 COLL VENOUS BLD VENIPUNCTURE: CPT

## 2019-12-16 PROCEDURE — 6370000000 HC RX 637 (ALT 250 FOR IP): Performed by: NURSE PRACTITIONER

## 2019-12-16 PROCEDURE — 93010 ELECTROCARDIOGRAM REPORT: CPT | Performed by: INTERNAL MEDICINE

## 2019-12-16 PROCEDURE — 1200000000 HC SEMI PRIVATE

## 2019-12-16 PROCEDURE — 94760 N-INVAS EAR/PLS OXIMETRY 1: CPT

## 2019-12-16 PROCEDURE — 80053 COMPREHEN METABOLIC PANEL: CPT

## 2019-12-16 PROCEDURE — 2580000003 HC RX 258: Performed by: HOSPITALIST

## 2019-12-16 PROCEDURE — 85610 PROTHROMBIN TIME: CPT

## 2019-12-16 PROCEDURE — 93306 TTE W/DOPPLER COMPLETE: CPT

## 2019-12-16 PROCEDURE — 99232 SBSQ HOSP IP/OBS MODERATE 35: CPT | Performed by: NURSE PRACTITIONER

## 2019-12-16 PROCEDURE — 78452 HT MUSCLE IMAGE SPECT MULT: CPT

## 2019-12-16 PROCEDURE — 94640 AIRWAY INHALATION TREATMENT: CPT

## 2019-12-16 PROCEDURE — 6370000000 HC RX 637 (ALT 250 FOR IP): Performed by: HOSPITALIST

## 2019-12-16 PROCEDURE — 3430000000 HC RX DIAGNOSTIC RADIOPHARMACEUTICAL: Performed by: INTERNAL MEDICINE

## 2019-12-16 PROCEDURE — 0100U HC RESPIRPTHGN MULT REV TRANS & AMP PRB TECH 21 TRGT: CPT

## 2019-12-16 PROCEDURE — 93017 CV STRESS TEST TRACING ONLY: CPT

## 2019-12-16 RX ORDER — TORSEMIDE 20 MG/1
40 TABLET ORAL DAILY
Status: DISCONTINUED | OUTPATIENT
Start: 2019-12-16 | End: 2019-12-16

## 2019-12-16 RX ORDER — SPIRONOLACTONE 25 MG/1
50 TABLET ORAL DAILY
Status: DISCONTINUED | OUTPATIENT
Start: 2019-12-16 | End: 2019-12-17 | Stop reason: HOSPADM

## 2019-12-16 RX ORDER — TORSEMIDE 20 MG/1
40 TABLET ORAL DAILY
Status: DISCONTINUED | OUTPATIENT
Start: 2019-12-17 | End: 2019-12-17 | Stop reason: HOSPADM

## 2019-12-16 RX ORDER — WARFARIN SODIUM 5 MG/1
10 TABLET ORAL
Status: COMPLETED | OUTPATIENT
Start: 2019-12-16 | End: 2019-12-16

## 2019-12-16 RX ORDER — WARFARIN SODIUM 7.5 MG/1
7.5 TABLET ORAL
Status: DISCONTINUED | OUTPATIENT
Start: 2019-12-16 | End: 2019-12-16

## 2019-12-16 RX ADMIN — DOCUSATE SODIUM 100 MG: 100 CAPSULE, LIQUID FILLED ORAL at 20:29

## 2019-12-16 RX ADMIN — POTASSIUM CHLORIDE 20 MEQ: 750 TABLET, FILM COATED, EXTENDED RELEASE ORAL at 20:29

## 2019-12-16 RX ADMIN — MOMETASONE FUROATE AND FORMOTEROL FUMARATE DIHYDRATE 2 PUFF: 200; 5 AEROSOL RESPIRATORY (INHALATION) at 20:44

## 2019-12-16 RX ADMIN — FOLIC ACID 1 MG: 1 TABLET ORAL at 10:51

## 2019-12-16 RX ADMIN — FUROSEMIDE 60 MG: 10 INJECTION, SOLUTION INTRAMUSCULAR; INTRAVENOUS at 10:50

## 2019-12-16 RX ADMIN — DOCUSATE SODIUM 100 MG: 100 CAPSULE, LIQUID FILLED ORAL at 10:51

## 2019-12-16 RX ADMIN — REGADENOSON 0.4 MG: 0.08 INJECTION, SOLUTION INTRAVENOUS at 09:01

## 2019-12-16 RX ADMIN — TETROFOSMIN 10 MILLICURIE: 1.38 INJECTION, POWDER, LYOPHILIZED, FOR SOLUTION INTRAVENOUS at 08:04

## 2019-12-16 RX ADMIN — POTASSIUM CHLORIDE 20 MEQ: 750 TABLET, FILM COATED, EXTENDED RELEASE ORAL at 10:51

## 2019-12-16 RX ADMIN — TETROFOSMIN 30 MILLICURIE: 1.38 INJECTION, POWDER, LYOPHILIZED, FOR SOLUTION INTRAVENOUS at 09:05

## 2019-12-16 RX ADMIN — SODIUM CHLORIDE, PRESERVATIVE FREE 10 ML: 5 INJECTION INTRAVENOUS at 20:29

## 2019-12-16 RX ADMIN — SPIRONOLACTONE 50 MG: 25 TABLET ORAL at 18:26

## 2019-12-16 RX ADMIN — WARFARIN SODIUM 10 MG: 5 TABLET ORAL at 18:26

## 2019-12-16 RX ADMIN — FAMOTIDINE 20 MG: 20 TABLET ORAL at 10:51

## 2019-12-16 RX ADMIN — TAMSULOSIN HYDROCHLORIDE 0.4 MG: 0.4 CAPSULE ORAL at 20:28

## 2019-12-16 RX ADMIN — ATORVASTATIN CALCIUM 20 MG: 20 TABLET, FILM COATED ORAL at 20:29

## 2019-12-16 RX ADMIN — METOPROLOL SUCCINATE 200 MG: 50 TABLET, EXTENDED RELEASE ORAL at 10:50

## 2019-12-16 RX ADMIN — FERROUS SULFATE TAB EC 324 MG (65 MG FE EQUIVALENT) 325 MG: 324 (65 FE) TABLET DELAYED RESPONSE at 10:51

## 2019-12-16 RX ADMIN — INSULIN LISPRO 1 UNITS: 100 INJECTION, SOLUTION INTRAVENOUS; SUBCUTANEOUS at 13:48

## 2019-12-16 RX ADMIN — ASPIRIN 81 MG 81 MG: 81 TABLET ORAL at 10:51

## 2019-12-16 RX ADMIN — SODIUM CHLORIDE, PRESERVATIVE FREE 10 ML: 5 INJECTION INTRAVENOUS at 11:00

## 2019-12-16 ASSESSMENT — PAIN SCALES - GENERAL
PAINLEVEL_OUTOF10: 0
PAINLEVEL_OUTOF10: 0

## 2019-12-17 VITALS
BODY MASS INDEX: 37.34 KG/M2 | OXYGEN SATURATION: 95 % | TEMPERATURE: 98 F | WEIGHT: 260.8 LBS | DIASTOLIC BLOOD PRESSURE: 71 MMHG | HEIGHT: 70 IN | HEART RATE: 65 BPM | RESPIRATION RATE: 18 BRPM | SYSTOLIC BLOOD PRESSURE: 120 MMHG

## 2019-12-17 LAB
A/G RATIO: 1.8 (ref 1.1–2.2)
ALBUMIN SERPL-MCNC: 4.4 G/DL (ref 3.4–5)
ALP BLD-CCNC: 73 U/L (ref 40–129)
ALT SERPL-CCNC: 25 U/L (ref 10–40)
ANION GAP SERPL CALCULATED.3IONS-SCNC: 16 MMOL/L (ref 3–16)
AST SERPL-CCNC: 16 U/L (ref 15–37)
BASOPHILS ABSOLUTE: 0.1 K/UL (ref 0–0.2)
BASOPHILS RELATIVE PERCENT: 0.7 %
BILIRUB SERPL-MCNC: 0.8 MG/DL (ref 0–1)
BUN BLDV-MCNC: 33 MG/DL (ref 7–20)
CALCIUM SERPL-MCNC: 9 MG/DL (ref 8.3–10.6)
CHLORIDE BLD-SCNC: 99 MMOL/L (ref 99–110)
CO2: 24 MMOL/L (ref 21–32)
CREAT SERPL-MCNC: 1.4 MG/DL (ref 0.8–1.3)
EOSINOPHILS ABSOLUTE: 0.1 K/UL (ref 0–0.6)
EOSINOPHILS RELATIVE PERCENT: 1.2 %
GFR AFRICAN AMERICAN: 58
GFR NON-AFRICAN AMERICAN: 48
GLOBULIN: 2.4 G/DL
GLUCOSE BLD-MCNC: 166 MG/DL (ref 70–99)
GLUCOSE BLD-MCNC: 188 MG/DL (ref 70–99)
GLUCOSE BLD-MCNC: 190 MG/DL (ref 70–99)
HCT VFR BLD CALC: 40.4 % (ref 40.5–52.5)
HEMOGLOBIN: 13.6 G/DL (ref 13.5–17.5)
INR BLD: 2.22 (ref 0.86–1.14)
LYMPHOCYTES ABSOLUTE: 1.4 K/UL (ref 1–5.1)
LYMPHOCYTES RELATIVE PERCENT: 19.4 %
MCH RBC QN AUTO: 28.8 PG (ref 26–34)
MCHC RBC AUTO-ENTMCNC: 33.7 G/DL (ref 31–36)
MCV RBC AUTO: 85.6 FL (ref 80–100)
MONOCYTES ABSOLUTE: 0.7 K/UL (ref 0–1.3)
MONOCYTES RELATIVE PERCENT: 9.8 %
NEUTROPHILS ABSOLUTE: 5.1 K/UL (ref 1.7–7.7)
NEUTROPHILS RELATIVE PERCENT: 68.9 %
PDW BLD-RTO: 15.4 % (ref 12.4–15.4)
PERFORMED ON: ABNORMAL
PERFORMED ON: ABNORMAL
PLATELET # BLD: 125 K/UL (ref 135–450)
PMV BLD AUTO: 9.6 FL (ref 5–10.5)
POTASSIUM REFLEX MAGNESIUM: 4 MMOL/L (ref 3.5–5.1)
PROTHROMBIN TIME: 26 SEC (ref 10–13.2)
RBC # BLD: 4.72 M/UL (ref 4.2–5.9)
SODIUM BLD-SCNC: 139 MMOL/L (ref 136–145)
TOTAL PROTEIN: 6.8 G/DL (ref 6.4–8.2)
WBC # BLD: 7.4 K/UL (ref 4–11)

## 2019-12-17 PROCEDURE — 6370000000 HC RX 637 (ALT 250 FOR IP)

## 2019-12-17 PROCEDURE — 99233 SBSQ HOSP IP/OBS HIGH 50: CPT | Performed by: INTERNAL MEDICINE

## 2019-12-17 PROCEDURE — 85610 PROTHROMBIN TIME: CPT

## 2019-12-17 PROCEDURE — 80053 COMPREHEN METABOLIC PANEL: CPT

## 2019-12-17 PROCEDURE — 93308 TTE F-UP OR LMTD: CPT

## 2019-12-17 PROCEDURE — 36415 COLL VENOUS BLD VENIPUNCTURE: CPT

## 2019-12-17 PROCEDURE — 6370000000 HC RX 637 (ALT 250 FOR IP): Performed by: NURSE PRACTITIONER

## 2019-12-17 PROCEDURE — 6370000000 HC RX 637 (ALT 250 FOR IP): Performed by: HOSPITALIST

## 2019-12-17 PROCEDURE — 85025 COMPLETE CBC W/AUTO DIFF WBC: CPT

## 2019-12-17 RX ORDER — WARFARIN SODIUM 7.5 MG/1
7.5 TABLET ORAL
Status: COMPLETED | OUTPATIENT
Start: 2019-12-17 | End: 2019-12-17

## 2019-12-17 RX ADMIN — METOPROLOL SUCCINATE 200 MG: 50 TABLET, EXTENDED RELEASE ORAL at 08:39

## 2019-12-17 RX ADMIN — WARFARIN SODIUM 7.5 MG: 7.5 TABLET ORAL at 16:18

## 2019-12-17 RX ADMIN — FOLIC ACID 1 MG: 1 TABLET ORAL at 08:39

## 2019-12-17 RX ADMIN — SPIRONOLACTONE 50 MG: 25 TABLET ORAL at 08:39

## 2019-12-17 RX ADMIN — DOCUSATE SODIUM 100 MG: 100 CAPSULE, LIQUID FILLED ORAL at 08:39

## 2019-12-17 RX ADMIN — FAMOTIDINE 20 MG: 20 TABLET ORAL at 08:39

## 2019-12-17 RX ADMIN — ASPIRIN 81 MG 81 MG: 81 TABLET ORAL at 08:39

## 2019-12-17 RX ADMIN — MOMETASONE FUROATE AND FORMOTEROL FUMARATE DIHYDRATE 2 PUFF: 200; 5 AEROSOL RESPIRATORY (INHALATION) at 08:33

## 2019-12-17 RX ADMIN — TORSEMIDE 40 MG: 20 TABLET ORAL at 08:39

## 2019-12-17 RX ADMIN — FERROUS SULFATE TAB EC 324 MG (65 MG FE EQUIVALENT) 325 MG: 324 (65 FE) TABLET DELAYED RESPONSE at 08:39

## 2019-12-17 RX ADMIN — POTASSIUM CHLORIDE 20 MEQ: 750 TABLET, FILM COATED, EXTENDED RELEASE ORAL at 08:39

## 2019-12-17 RX ADMIN — INSULIN LISPRO 1 UNITS: 100 INJECTION, SOLUTION INTRAVENOUS; SUBCUTANEOUS at 08:40

## 2019-12-17 RX ADMIN — INSULIN LISPRO 1 UNITS: 100 INJECTION, SOLUTION INTRAVENOUS; SUBCUTANEOUS at 13:05

## 2019-12-17 ASSESSMENT — PAIN SCALES - GENERAL: PAINLEVEL_OUTOF10: 0

## 2019-12-19 ENCOUNTER — FOLLOWUP TELEPHONE ENCOUNTER (OUTPATIENT)
Dept: INPATIENT UNIT | Age: 84
End: 2019-12-19

## 2020-10-31 ENCOUNTER — APPOINTMENT (OUTPATIENT)
Dept: GENERAL RADIOLOGY | Age: 85
DRG: 177 | End: 2020-10-31
Payer: MEDICARE

## 2020-10-31 ENCOUNTER — HOSPITAL ENCOUNTER (INPATIENT)
Age: 85
LOS: 3 days | Discharge: HOME | DRG: 177 | End: 2020-11-03
Attending: INTERNAL MEDICINE | Admitting: INTERNAL MEDICINE
Payer: MEDICARE

## 2020-10-31 ENCOUNTER — APPOINTMENT (OUTPATIENT)
Dept: CT IMAGING | Age: 85
DRG: 177 | End: 2020-10-31
Payer: MEDICARE

## 2020-10-31 PROBLEM — R06.00 DYSPNEA: Status: ACTIVE | Noted: 2020-10-31

## 2020-10-31 LAB
A/G RATIO: 1.1 (ref 1.1–2.2)
ALBUMIN SERPL-MCNC: 3.9 G/DL (ref 3.4–5)
ALP BLD-CCNC: 75 U/L (ref 40–129)
ALT SERPL-CCNC: 69 U/L (ref 10–40)
ANION GAP SERPL CALCULATED.3IONS-SCNC: 15 MMOL/L (ref 3–16)
AST SERPL-CCNC: 75 U/L (ref 15–37)
BASE EXCESS VENOUS: 5.7 MMOL/L
BASOPHILS ABSOLUTE: 0 K/UL (ref 0–0.2)
BASOPHILS RELATIVE PERCENT: 0.4 %
BILIRUB SERPL-MCNC: 0.9 MG/DL (ref 0–1)
BILIRUBIN URINE: NEGATIVE
BLOOD, URINE: NEGATIVE
BUN BLDV-MCNC: 37 MG/DL (ref 7–20)
CALCIUM SERPL-MCNC: 8.7 MG/DL (ref 8.3–10.6)
CARBOXYHEMOGLOBIN: 1.9 %
CHLORIDE BLD-SCNC: 103 MMOL/L (ref 99–110)
CLARITY: CLEAR
CO2: 27 MMOL/L (ref 21–32)
COLOR: YELLOW
CREAT SERPL-MCNC: 1.5 MG/DL (ref 0.8–1.3)
EOSINOPHILS ABSOLUTE: 0 K/UL (ref 0–0.6)
EOSINOPHILS RELATIVE PERCENT: 0.2 %
GFR AFRICAN AMERICAN: 54
GFR NON-AFRICAN AMERICAN: 44
GLOBULIN: 3.5 G/DL
GLUCOSE BLD-MCNC: 203 MG/DL (ref 70–99)
GLUCOSE BLD-MCNC: 224 MG/DL (ref 70–99)
GLUCOSE URINE: >=1000 MG/DL
HCO3 VENOUS: 31 MMOL/L (ref 23–29)
HCT VFR BLD CALC: 41.3 % (ref 40.5–52.5)
HEMOGLOBIN: 13.5 G/DL (ref 13.5–17.5)
INR BLD: 2.82 (ref 0.86–1.14)
KETONES, URINE: NEGATIVE MG/DL
LACTIC ACID: 1.8 MMOL/L (ref 0.4–2)
LEUKOCYTE ESTERASE, URINE: NEGATIVE
LYMPHOCYTES ABSOLUTE: 0.7 K/UL (ref 1–5.1)
LYMPHOCYTES RELATIVE PERCENT: 10.8 %
MCH RBC QN AUTO: 27.2 PG (ref 26–34)
MCHC RBC AUTO-ENTMCNC: 32.7 G/DL (ref 31–36)
MCV RBC AUTO: 83.2 FL (ref 80–100)
METHEMOGLOBIN VENOUS: 0.4 %
MICROSCOPIC EXAMINATION: ABNORMAL
MONOCYTES ABSOLUTE: 0.8 K/UL (ref 0–1.3)
MONOCYTES RELATIVE PERCENT: 12.5 %
NEUTROPHILS ABSOLUTE: 4.6 K/UL (ref 1.7–7.7)
NEUTROPHILS RELATIVE PERCENT: 76.1 %
NITRITE, URINE: NEGATIVE
O2 CONTENT, VEN: 13 ML/DL
O2 SAT, VEN: 63 %
O2 THERAPY: ABNORMAL
PCO2, VEN: 45 MMHG (ref 40–50)
PDW BLD-RTO: 16.9 % (ref 12.4–15.4)
PERFORMED ON: ABNORMAL
PH UA: 5 (ref 5–8)
PH VENOUS: 7.44 (ref 7.35–7.45)
PLATELET # BLD: 144 K/UL (ref 135–450)
PMV BLD AUTO: 8.9 FL (ref 5–10.5)
PO2, VEN: 31 MMHG
POTASSIUM REFLEX MAGNESIUM: 4.1 MMOL/L (ref 3.5–5.1)
PRO-BNP: 1255 PG/ML (ref 0–449)
PROTEIN UA: NEGATIVE MG/DL
PROTHROMBIN TIME: 33 SEC (ref 10–13.2)
RBC # BLD: 4.96 M/UL (ref 4.2–5.9)
SARS-COV-2, NAAT: DETECTED
SODIUM BLD-SCNC: 145 MMOL/L (ref 136–145)
SPECIFIC GRAVITY UA: 1.02 (ref 1–1.03)
TCO2 CALC VENOUS: 32 MMOL/L
TOTAL PROTEIN: 7.4 G/DL (ref 6.4–8.2)
TROPONIN: <0.01 NG/ML
URINE REFLEX TO CULTURE: ABNORMAL
URINE TYPE: ABNORMAL
UROBILINOGEN, URINE: 0.2 E.U./DL
WBC # BLD: 6 K/UL (ref 4–11)

## 2020-10-31 PROCEDURE — 96366 THER/PROPH/DIAG IV INF ADDON: CPT

## 2020-10-31 PROCEDURE — 85610 PROTHROMBIN TIME: CPT

## 2020-10-31 PROCEDURE — 6370000000 HC RX 637 (ALT 250 FOR IP): Performed by: NURSE PRACTITIONER

## 2020-10-31 PROCEDURE — 2580000003 HC RX 258: Performed by: NURSE PRACTITIONER

## 2020-10-31 PROCEDURE — 96375 TX/PRO/DX INJ NEW DRUG ADDON: CPT

## 2020-10-31 PROCEDURE — 2500000003 HC RX 250 WO HCPCS: Performed by: NURSE PRACTITIONER

## 2020-10-31 PROCEDURE — 71045 X-RAY EXAM CHEST 1 VIEW: CPT

## 2020-10-31 PROCEDURE — 36415 COLL VENOUS BLD VENIPUNCTURE: CPT

## 2020-10-31 PROCEDURE — 96361 HYDRATE IV INFUSION ADD-ON: CPT

## 2020-10-31 PROCEDURE — 94640 AIRWAY INHALATION TREATMENT: CPT

## 2020-10-31 PROCEDURE — U0002 COVID-19 LAB TEST NON-CDC: HCPCS

## 2020-10-31 PROCEDURE — 2060000000 HC ICU INTERMEDIATE R&B

## 2020-10-31 PROCEDURE — 2580000003 HC RX 258: Performed by: INTERNAL MEDICINE

## 2020-10-31 PROCEDURE — 99285 EMERGENCY DEPT VISIT HI MDM: CPT

## 2020-10-31 PROCEDURE — 6370000000 HC RX 637 (ALT 250 FOR IP): Performed by: INTERNAL MEDICINE

## 2020-10-31 PROCEDURE — 80053 COMPREHEN METABOLIC PANEL: CPT

## 2020-10-31 PROCEDURE — 82803 BLOOD GASES ANY COMBINATION: CPT

## 2020-10-31 PROCEDURE — 94761 N-INVAS EAR/PLS OXIMETRY MLT: CPT

## 2020-10-31 PROCEDURE — 6360000002 HC RX W HCPCS: Performed by: INTERNAL MEDICINE

## 2020-10-31 PROCEDURE — 6360000002 HC RX W HCPCS: Performed by: NURSE PRACTITIONER

## 2020-10-31 PROCEDURE — 93005 ELECTROCARDIOGRAM TRACING: CPT | Performed by: NURSE PRACTITIONER

## 2020-10-31 PROCEDURE — 83605 ASSAY OF LACTIC ACID: CPT

## 2020-10-31 PROCEDURE — 2500000003 HC RX 250 WO HCPCS: Performed by: INTERNAL MEDICINE

## 2020-10-31 PROCEDURE — 81003 URINALYSIS AUTO W/O SCOPE: CPT

## 2020-10-31 PROCEDURE — 83880 ASSAY OF NATRIURETIC PEPTIDE: CPT

## 2020-10-31 PROCEDURE — 71250 CT THORAX DX C-: CPT

## 2020-10-31 PROCEDURE — 87040 BLOOD CULTURE FOR BACTERIA: CPT

## 2020-10-31 PROCEDURE — 84484 ASSAY OF TROPONIN QUANT: CPT

## 2020-10-31 PROCEDURE — 85025 COMPLETE CBC W/AUTO DIFF WBC: CPT

## 2020-10-31 PROCEDURE — 96365 THER/PROPH/DIAG IV INF INIT: CPT

## 2020-10-31 RX ORDER — MAGNESIUM SULFATE IN WATER 40 MG/ML
2 INJECTION, SOLUTION INTRAVENOUS PRN
Status: DISCONTINUED | OUTPATIENT
Start: 2020-10-31 | End: 2020-11-03 | Stop reason: HOSPADM

## 2020-10-31 RX ORDER — WARFARIN SODIUM 5 MG/1
7.5 TABLET ORAL
Status: COMPLETED | OUTPATIENT
Start: 2020-10-31 | End: 2020-10-31

## 2020-10-31 RX ORDER — BUDESONIDE AND FORMOTEROL FUMARATE DIHYDRATE 160; 4.5 UG/1; UG/1
2 AEROSOL RESPIRATORY (INHALATION) 2 TIMES DAILY
Status: DISCONTINUED | OUTPATIENT
Start: 2020-10-31 | End: 2020-11-03 | Stop reason: HOSPADM

## 2020-10-31 RX ORDER — SODIUM CHLORIDE, SODIUM LACTATE, POTASSIUM CHLORIDE, CALCIUM CHLORIDE 600; 310; 30; 20 MG/100ML; MG/100ML; MG/100ML; MG/100ML
1000 INJECTION, SOLUTION INTRAVENOUS ONCE
Status: COMPLETED | OUTPATIENT
Start: 2020-10-31 | End: 2020-10-31

## 2020-10-31 RX ORDER — POTASSIUM CHLORIDE 7.45 MG/ML
10 INJECTION INTRAVENOUS PRN
Status: DISCONTINUED | OUTPATIENT
Start: 2020-10-31 | End: 2020-11-03 | Stop reason: HOSPADM

## 2020-10-31 RX ORDER — ACETAMINOPHEN 650 MG/1
650 SUPPOSITORY RECTAL EVERY 6 HOURS PRN
Status: DISCONTINUED | OUTPATIENT
Start: 2020-10-31 | End: 2020-11-03 | Stop reason: HOSPADM

## 2020-10-31 RX ORDER — METOPROLOL SUCCINATE 50 MG/1
150 TABLET, EXTENDED RELEASE ORAL DAILY
Status: DISCONTINUED | OUTPATIENT
Start: 2020-11-01 | End: 2020-11-03 | Stop reason: HOSPADM

## 2020-10-31 RX ORDER — DEXTROSE MONOHYDRATE 25 G/50ML
12.5 INJECTION, SOLUTION INTRAVENOUS PRN
Status: DISCONTINUED | OUTPATIENT
Start: 2020-10-31 | End: 2020-11-03 | Stop reason: HOSPADM

## 2020-10-31 RX ORDER — SODIUM CHLORIDE 0.9 % (FLUSH) 0.9 %
10 SYRINGE (ML) INJECTION PRN
Status: DISCONTINUED | OUTPATIENT
Start: 2020-10-31 | End: 2020-11-03 | Stop reason: HOSPADM

## 2020-10-31 RX ORDER — DILTIAZEM HYDROCHLORIDE 5 MG/ML
10 INJECTION INTRAVENOUS ONCE
Status: COMPLETED | OUTPATIENT
Start: 2020-10-31 | End: 2020-10-31

## 2020-10-31 RX ORDER — INSULIN GLARGINE 100 [IU]/ML
10 INJECTION, SOLUTION SUBCUTANEOUS NIGHTLY
Status: DISCONTINUED | OUTPATIENT
Start: 2020-10-31 | End: 2020-11-03 | Stop reason: HOSPADM

## 2020-10-31 RX ORDER — FUROSEMIDE 10 MG/ML
40 INJECTION INTRAMUSCULAR; INTRAVENOUS ONCE
Status: COMPLETED | OUTPATIENT
Start: 2020-10-31 | End: 2020-10-31

## 2020-10-31 RX ORDER — ACETAMINOPHEN 325 MG/1
650 TABLET ORAL EVERY 6 HOURS PRN
Status: DISCONTINUED | OUTPATIENT
Start: 2020-10-31 | End: 2020-11-03 | Stop reason: HOSPADM

## 2020-10-31 RX ORDER — DEXAMETHASONE SODIUM PHOSPHATE 10 MG/ML
6 INJECTION, SOLUTION INTRAMUSCULAR; INTRAVENOUS EVERY 24 HOURS
Status: DISCONTINUED | OUTPATIENT
Start: 2020-10-31 | End: 2020-11-03 | Stop reason: HOSPADM

## 2020-10-31 RX ORDER — ACETAMINOPHEN 500 MG
1000 TABLET ORAL ONCE
Status: COMPLETED | OUTPATIENT
Start: 2020-10-31 | End: 2020-10-31

## 2020-10-31 RX ORDER — NICOTINE POLACRILEX 4 MG
15 LOZENGE BUCCAL PRN
Status: DISCONTINUED | OUTPATIENT
Start: 2020-10-31 | End: 2020-11-03 | Stop reason: HOSPADM

## 2020-10-31 RX ORDER — DEXTROSE MONOHYDRATE 50 MG/ML
100 INJECTION, SOLUTION INTRAVENOUS PRN
Status: DISCONTINUED | OUTPATIENT
Start: 2020-10-31 | End: 2020-11-03 | Stop reason: HOSPADM

## 2020-10-31 RX ORDER — IPRATROPIUM BROMIDE AND ALBUTEROL SULFATE 2.5; .5 MG/3ML; MG/3ML
1 SOLUTION RESPIRATORY (INHALATION) EVERY 4 HOURS PRN
Status: DISCONTINUED | OUTPATIENT
Start: 2020-10-31 | End: 2020-10-31 | Stop reason: CLARIF

## 2020-10-31 RX ORDER — PROMETHAZINE HYDROCHLORIDE 25 MG/1
12.5 TABLET ORAL EVERY 6 HOURS PRN
Status: DISCONTINUED | OUTPATIENT
Start: 2020-10-31 | End: 2020-11-03 | Stop reason: HOSPADM

## 2020-10-31 RX ORDER — ATORVASTATIN CALCIUM 20 MG/1
20 TABLET, FILM COATED ORAL NIGHTLY
Status: DISCONTINUED | OUTPATIENT
Start: 2020-10-31 | End: 2020-11-03 | Stop reason: HOSPADM

## 2020-10-31 RX ORDER — ONDANSETRON 2 MG/ML
4 INJECTION INTRAMUSCULAR; INTRAVENOUS EVERY 6 HOURS PRN
Status: DISCONTINUED | OUTPATIENT
Start: 2020-10-31 | End: 2020-11-03 | Stop reason: HOSPADM

## 2020-10-31 RX ORDER — DEXAMETHASONE SODIUM PHOSPHATE 10 MG/ML
10 INJECTION, SOLUTION INTRAMUSCULAR; INTRAVENOUS ONCE
Status: COMPLETED | OUTPATIENT
Start: 2020-10-31 | End: 2020-10-31

## 2020-10-31 RX ORDER — DEXAMETHASONE SODIUM PHOSPHATE 10 MG/ML
10 INJECTION, SOLUTION INTRAMUSCULAR; INTRAVENOUS EVERY 6 HOURS
Status: DISCONTINUED | OUTPATIENT
Start: 2020-10-31 | End: 2020-10-31

## 2020-10-31 RX ORDER — SODIUM CHLORIDE 0.9 % (FLUSH) 0.9 %
10 SYRINGE (ML) INJECTION EVERY 12 HOURS SCHEDULED
Status: DISCONTINUED | OUTPATIENT
Start: 2020-10-31 | End: 2020-11-03 | Stop reason: HOSPADM

## 2020-10-31 RX ORDER — TORSEMIDE 20 MG/1
40 TABLET ORAL DAILY
Status: DISCONTINUED | OUTPATIENT
Start: 2020-11-01 | End: 2020-11-03 | Stop reason: HOSPADM

## 2020-10-31 RX ORDER — TAMSULOSIN HYDROCHLORIDE 0.4 MG/1
0.4 CAPSULE ORAL NIGHTLY
Status: DISCONTINUED | OUTPATIENT
Start: 2020-10-31 | End: 2020-11-03 | Stop reason: HOSPADM

## 2020-10-31 RX ADMIN — FUROSEMIDE 40 MG: 10 INJECTION, SOLUTION INTRAMUSCULAR; INTRAVENOUS at 14:20

## 2020-10-31 RX ADMIN — Medication 10 ML: at 20:24

## 2020-10-31 RX ADMIN — DILTIAZEM HYDROCHLORIDE 5 MG/HR: 5 INJECTION INTRAVENOUS at 20:25

## 2020-10-31 RX ADMIN — ACETAMINOPHEN 1000 MG: 500 TABLET ORAL at 15:06

## 2020-10-31 RX ADMIN — INSULIN LISPRO 2 UNITS: 100 INJECTION, SOLUTION INTRAVENOUS; SUBCUTANEOUS at 23:08

## 2020-10-31 RX ADMIN — INSULIN GLARGINE 10 UNITS: 100 INJECTION, SOLUTION SUBCUTANEOUS at 23:08

## 2020-10-31 RX ADMIN — DILTIAZEM HYDROCHLORIDE 10 MG: 5 INJECTION INTRAVENOUS at 13:12

## 2020-10-31 RX ADMIN — AZITHROMYCIN MONOHYDRATE 500 MG: 500 INJECTION, POWDER, LYOPHILIZED, FOR SOLUTION INTRAVENOUS at 14:59

## 2020-10-31 RX ADMIN — DEXAMETHASONE SODIUM PHOSPHATE 10 MG: 10 INJECTION, SOLUTION INTRAMUSCULAR; INTRAVENOUS at 14:46

## 2020-10-31 RX ADMIN — SODIUM CHLORIDE, POTASSIUM CHLORIDE, SODIUM LACTATE AND CALCIUM CHLORIDE 1000 ML: 600; 310; 30; 20 INJECTION, SOLUTION INTRAVENOUS at 12:56

## 2020-10-31 RX ADMIN — ATORVASTATIN CALCIUM 20 MG: 20 TABLET, FILM COATED ORAL at 20:23

## 2020-10-31 RX ADMIN — TAMSULOSIN HYDROCHLORIDE 0.4 MG: 0.4 CAPSULE ORAL at 20:23

## 2020-10-31 RX ADMIN — WARFARIN SODIUM 7.5 MG: 5 TABLET ORAL at 20:23

## 2020-10-31 RX ADMIN — DEXAMETHASONE SODIUM PHOSPHATE 6 MG: 10 INJECTION, SOLUTION INTRAMUSCULAR; INTRAVENOUS at 20:24

## 2020-10-31 RX ADMIN — BUDESONIDE AND FORMOTEROL FUMARATE DIHYDRATE 2 PUFF: 160; 4.5 AEROSOL RESPIRATORY (INHALATION) at 21:49

## 2020-10-31 ASSESSMENT — ENCOUNTER SYMPTOMS
BACK PAIN: 0
ABDOMINAL PAIN: 0
WHEEZING: 0
COLOR CHANGE: 0
COUGH: 1
VOMITING: 0
NAUSEA: 0
DIARRHEA: 0
SHORTNESS OF BREATH: 1

## 2020-10-31 ASSESSMENT — PAIN SCALES - GENERAL
PAINLEVEL_OUTOF10: 0
PAINLEVEL_OUTOF10: 8
PAINLEVEL_OUTOF10: 0
PAINLEVEL_OUTOF10: 0

## 2020-10-31 NOTE — ED NOTES
Pt's HR down to upper 90s and intermittently 110s;  Patricia NP notified; ordered to only give carizem bolus; no drip and run IV fluids over 90 minutes       Ilene Ewing RN  10/31/20 6038

## 2020-10-31 NOTE — H&P
Hospital Medicine History & Physical      PCP: Abeba Telles    Date of Admission: 10/31/2020    Chief Complaint: Generalized fatigue, tiredness    History Of Present Illness:  Patient is a 59-year-old male with past medical history of COPD, diabetes mellitus, atrial fibrillation, who presents to the hospital for difficulty breathing. According to the patient he is feeling fatigued, does not have appetite for the past 10 days. He mentions this started after he got flu shot on 19/10. Patient also mentions feeling subjective fevers, he has cough-nonproductive. He also endorses shortness of breath and feeling congested. Denies chest pain nausea vomiting diarrhea constipation. Past Medical History:          Diagnosis Date    Atrial fibrillation ([facility] [address] [facility]     Cancer ([facility]     CHF (congestive heart failure) (HCC)     COPD (chronic obstructive pulmonary disease) (HCC)     Diabetes (HCC)     Melanoma of skin, site unspecified     Malignant melanoma    Pain in back     Pulmonary emboli (HCC)        Past Surgical History:          Procedure Laterality Date    COLONOSCOPY      COLONOSCOPY  03/07/2018    PACEMAKER INSERTION      PACEMAKER PLACEMENT      removal    SKIN CANCER EXCISION Right     TOE SURGERY Right        Medications Prior to Admission:      Prior to Admission medications    Medication Sig Start Date End Date Taking?  Authorizing Provider   empagliflozin (JARDIANCE) 10 MG tablet Take 10 mg by mouth daily 3/17/20  Yes Historical Provider, MD   insulin lispro (HUMALOG) 100 UNIT/ML injection vial Inject 0-3 Units into the skin 3 times daily (before meals)    Historical Provider, MD   SITagliptin (JANUVIA) 50 MG tablet Take 50 mg by mouth daily    Historical Provider, MD   docusate sodium (COLACE) 100 MG capsule Take 100 mg by mouth 2 times daily    Historical Provider, MD   albuterol (PROVENTIL) (2.5 MG/3ML) 0.083% nebulizer solution Take 2.5 mg by nebulization every 6 hours as needed for Wheezing    Historical Provider, MD   metoprolol succinate (TOPROL XL) 100 MG extended release tablet Take 150 mg by mouth daily     Historical Provider, MD   warfarin (COUMADIN) 5 MG tablet Take 7.5 mg by mouth every evening Except 10 mg every Friday    Historical Provider, MD   Probiotic Product (PROBIOTIC-10 PO) Take 1 tablet by mouth daily    Historical Provider, MD   torsemide (DEMADEX) 20 MG tablet Take 40 mg by mouth daily     Historical Provider, MD   potassium chloride (KLOR-CON M) 20 MEQ TBCR extended release tablet Take 20 mEq by mouth 2 times daily     Historical Provider, MD   folic acid (FOLVITE) 1 MG tablet Take 1 mg by mouth daily    Historical Provider, MD   mometasone-formoterol (DULERA) 200-5 MCG/ACT inhaler Inhale 2 puffs into the lungs every 12 hours    Historical Provider, MD   ferrous sulfate 325 (65 FE) MG tablet Take 325 mg by mouth daily (with breakfast)     Historical Provider, MD   atorvastatin (LIPITOR) 20 MG tablet Take 20 mg by mouth nightly     Historical Provider, MD Landis Hinders LANCETS 31X MISC by Does not apply route    Historical Provider, MD   tamsulosin (FLOMAX) 0.4 MG capsule Take 0.4 mg by mouth nightly     Historical Provider, MD       Allergies:  No known allergies    Social History:      TOBACCO:   reports that he has quit smoking. He has never used smokeless tobacco.  ETOH:   reports current alcohol use. Family History:       Reviewed in detail and non contributory      History reviewed. No pertinent family history. REVIEW OF SYSTEMS:   Pertinent positives as noted in the HPI. All other systems reviewed and negative. PHYSICAL EXAM PERFORMED:    BP (!) 128/58   Pulse 130   Temp 97.6 °F (36.4 °C) (Oral)   Resp 22   Ht 5' 10\" (1.778 m)   Wt 248 lb 14.4 oz (112.9 kg)   SpO2 92%   BMI 35.71 kg/m²     General appearance:  No apparent distress, cooperative. HEENT:  Normal cephalic, atraumatic without obvious deformity.  Conjunctivae/corneas clear.  Neck: Supple, with full range of motion. No cervical lymphadenopathy  Respiratory:  Normal respiratory effort. Clear to auscultation, bilaterally without Rales/Wheezes/Rhonchi. Cardiovascular:  Regular rate and rhythm with normal S1/S2 without murmurs, rubs or gallops. Abdomen: Soft, non-tender, non-distended, normal bowel sounds. Musculoskeletal:  No edema noted bilaterally. No tenderness on palpation   Skin: no rash visible  Neurologic:  Neurologically intact without any focal sensory/motor deficits. grossly non-focal.  Psychiatric:  Alert and oriented, normal mood  Peripheral Pulses: +2 palpable, equal bilaterally       Labs:     Recent Labs     10/31/20  1242   WBC 6.0   HGB 13.5   HCT 41.3        Recent Labs     10/31/20  1242      K 4.1      CO2 27   BUN 37*   CREATININE 1.5*   CALCIUM 8.7     Recent Labs     10/31/20  1242   AST 75*   ALT 69*   BILITOT 0.9   ALKPHOS 75     Recent Labs     10/31/20  1242   INR 2.82*     Recent Labs     10/31/20  1242   TROPONINI <0.01       Urinalysis:      Lab Results   Component Value Date    NITRU Negative 10/31/2020    WBCUA 750 01/25/2018    RBCUA 4 01/25/2018    BLOODU Negative 10/31/2020    SPECGRAV 1.020 10/31/2020    GLUCOSEU >=1000 10/31/2020       Radiology:       CT CHEST WO CONTRAST   Final Result   Scattered ground-glass opacities are seen throughout the lungs bilaterally,   right greater than left, predominantly peripheral in the right upper lobe. Findings are likely due to the reported history of COVID-19 pneumonia. Tiny right-sided pleural effusion         XR CHEST PORTABLE   Final Result   Moderate cardiomegaly. Slight pulmonary vascular congestion. No definite   acute airspace disease. RECOMMENDATION:   If symptoms disproportionate to radiographic findings, CT chest would be   recommended to see if early pneumonia present and obscured by vascular   congestion.                  Active Hospital Problems    Diagnosis Date Noted    Dyspnea [R06.00] 10/31/2020       Patient is a 55-year-old male with past medical history of COPD, diabetes mellitus, atrial fibrillation, who presents to the hospital for difficulty breathing. According to the patient he is feeling fatigued, does not have appetite for the past 10 days. He mentions this started after he got flu shot on 19/10. Patient also mentions feeling subjective fevers, he has cough-nonproductive. He also endorses shortness of breath and feeling congested. Denies chest pain nausea vomiting diarrhea constipation. Assessment  Generalized weakness, fatigue secondary to COVID-19 infection  Acute hypoxic respiratory failure episode in ED satting less than 90% on room air  Atrial fibrillation with RVR on Coumadin  Hypoglycemia secondary to uncontrolled diabetes  Elevated creatinine likely secondary CKD stage III  Multifocal pneumonia secondary to COVID-19 pneumonia  Diabetes mellitus  Hypertension  COPD    Plan  Start dexamethasone, consult pulmonary, oxygen supplementation, DuoNeb, DVT prophylaxis with heparin 5000 units every 8 hours, continue to monitor pulse oximetry  S/p IV Cardizem bolus in ED, IV Cardizem gtt, resume home Coumadin, resume home torsemide, consult cardiology  Initiate insulin sliding scale, add 10 units Lantus nightly  Diet: DIET CARDIAC;  Code Status: Full Code    PT/OT Eval Status: ordered    Dispo - pending clinical improvement       Norma Hughes MD    The note was completed using EMR and Dragon dictation system. Every effort was made to ensure accuracy; however, inadvertent computerized transcription errors may be present. Thank you [address] [place] [address] for the opportunity to be involved in this patient's care. If you have any questions or concerns please feel free to contact me at [phone].     Norma Hughes MD

## 2020-10-31 NOTE — ED NOTES
Patient resting comfortably, respirations easy, unlabored. Patient in no acute distress. Pt resting with eyes closed; HR 68 afib Call light within reach, bed in lowest position, side rails up x 2.         Rishabh Pearson RN  10/31/20 0881

## 2020-10-31 NOTE — PROGRESS NOTES
Medication Reconciliation    List of medications patient is currently taking is incomplete. Unable to speak with patient at this time 2/2 COVID-19  Medications on Home Medication tab look to be correct however I was unable to confirm with patient the following based on dispense report and Care Everywhere:  - torsemide 20 mg frequency (QD or BID)  - sitagliptin dose (50 or 100 mg)  - if patient has albuterol nebulizer  - if patient is on insulin therapy  - empaglifozin dose (10 mg or 25 mg)  - number of doses received of cephalexin    I matched the medication list to the recent Office Visit on 08/31/20. I was unable to see recent anti-coag notes on his current warfarin dosage.     Laine Mancilla, Pharmacy Intern  10/31/2020 2:40 PM

## 2020-10-31 NOTE — ED NOTES
Pt to room 5252 per ed tech on bedside monitor; pt w/o any needs prior to transfer; Austyn Mims RN notified.       Luis Fernando Vital RN  10/31/20 32 Amada Michael  10/31/20 1944

## 2020-10-31 NOTE — ED NOTES
Pt arrived to ed via personal car per son; pt alert and oriented x4; Afib 130s; pt states just feels tired/fatigued; loss of taste and appetite; resp easy and even; Patricia NP at bedside and updated on pt's status;      Umer Moss RN  10/31/20 9660

## 2020-10-31 NOTE — PROGRESS NOTES
Patient here from ED via stretcher. ST on tele. Confirmed with [facility]. VSS. Patient oriented to unit and room. Admit assessment complete and history obtained. Orders reviewed with patient and POC discussed. Call light in reach. Bed in lowest position, bed alarm on. Denies other needs. Will continue to monitor.

## 2020-10-31 NOTE — CONSULTS
Clinical Pharmacy Note  Warfarin Consult    Consuelo Max is a 80 y.o. male receiving warfarin managed by pharmacy. Patient being bridged with none. Warfarin Indication: afib  Target INR range: 2-3   Dose prior to admission:     Current warfarin drug-drug interactions:     Recent Labs     10/31/20  1242   HGB 13.5   HCT 41.3   INR 2.82*       Assessment/Plan:    Warfarin 7.5 mg tonight. Daily PT/INR until stable within therapeutic range. Thank you for the consult. Will continue to follow.

## 2020-10-31 NOTE — ED PROVIDER NOTES
**ADVANCED PRACTICE PROVIDER, I HAVE EVALUATED THIS PATIENT**        [address]      Pt Name: Hilaria England  PXA:[id number]  [place] 1934  Date of evaluation: 10/31/2020  Provider: CADE Jiang - GRACE      Chief Complaint:    Chief Complaint   Patient presents with    Fatigue     COVID +. dx with pneumonia last week at urgent care. c/o feeling \"more tired\", decreased oral intake, and occ shortness of breath. to ED for eval        Nursing Notes, Past Medical Hx, Past Surgical Hx, Social Hx, Allergies, and Family Hx were all reviewed and agreed with or any disagreements were addressed in the HPI.    HPI:  (Location, Duration, Timing, Severity, Quality, Assoc Sx, Context, Modifying factors)  This is a  80 y.o. male presents emergency department in acute respiratory distress, patient states on the 19th he received a flu shot. States she was doing okay until a few days later he started having cough, congestion and shortness of breath. States that he had a Covid test and it was negative. However few days later he went to the doctor, had a chest x-ray and was told he had pneumonia. He is completed his antibiotics however symptoms are to get worse again last Friday, October 23, he had a repeat Covid test and it was positive. Patient states that over the past week he had increased shortness of breath, dry nonproductive cough, fatigue, lack of energy, no appetite, has not been able to eat or drink because he just does not feel well. He does complain of some generalized body aches however no headache neck pain or neck stiffness. No chest pain point chest pain or shortness of breath. He states he has loss of taste and no appetite, but denies any abdominal pain, no nausea vomiting or diarrhea. He denies any pain on exam, states he feels very rundown. He denies any additional complaints, no additional aggravating relieving factors. The patient presents awake, alert, slightly dyspneic and is tachycardic as he appears in mild distress.     PastMedical/Surgical History:      Diagnosis Date    At[facility] ([facility]     [facility]     CHF (congestive heart failure) (HCC)     COPD [facility]chronic obstructive pulmonary disease) [facility]     Diabetes (HCC)     Melanoma of skin, site unspecified     Malignant melanoma    Pain in back     Pulmonary emboli (HCC)          Procedure Laterality Date    COLONOSCOPY      COLONOSCOPY  03/07/2018    PACEMAKER INSERTION      PACEMAKER PLACEMENT      removal    SKIN CANCER EXCISION Right     TOE SURGERY Right        Medications:  Previous Medications    ALBUTEROL (PROVENTIL) (2.5 MG/3ML) 0.083% NEBULIZER SOLUTION    Take 2.5 mg by nebulization every 6 hours as needed for Wheezing    ATORVASTATIN (LIPITOR) 20 MG TABLET    Take 20 mg by mouth nightly     DOCUSATE SODIUM (COLACE) 100 MG CAPSULE    Take 100 mg by mouth 2 times daily    EMPAGLIFLOZIN (JARDIANCE) 10 MG TABLET    Take 10 mg by mouth daily    FERROUS SULFATE 325 (65 FE) MG TABLET    Take 325 mg by mouth daily (with breakfast)     FOLIC ACID (FOLVITE) 1 MG TABLET    Take 1 mg by mouth daily    INSULIN LISPRO (HUMALOG) 100 UNIT/ML INJECTION VIAL    Inject 0-3 Units into the skin 3 times daily (before meals)    METOPROLOL SUCCINATE (TOPROL XL) 100 MG EXTENDED RELEASE TABLET    Take 150 mg by mouth daily     MOMETASONE-FORMOTEROL (DULERA) 200-5 MCG/ACT INHALER    Inhale 2 puffs into the lungs every 12 hours    ONETOUCH DELICA LANCETS 34S MISC    by Does not apply route    POTASSIUM CHLORIDE (KLOR-CON M) 20 MEQ TBCR EXTENDED RELEASE TABLET    Take 20 mEq by mouth 2 times daily     PROBIOTIC PRODUCT (PROBIOTIC-10 PO)    Take 1 tablet by mouth daily    SITAGLIPTIN (JANUVIA) 50 MG TABLET    Take 50 mg by mouth daily    TAMSULOSIN (FLOMAX) 0.4 MG CAPSULE    Take 0.4 mg by mouth nightly     TORSEMIDE (DEMADEX) 20 MG TABLET    Take 40 mg by mouth daily WARFARIN (COUMADIN) 5 MG TABLET    Take 7.5 mg by mouth every evening Except 10 mg every Friday         Review of Systems:  Review of Systems   Constitutional: Positive for fatigue. Negative for chills and fever. HENT: Positive for congestion. Respiratory: Positive for cough and shortness of breath. Negative for wheezing. Patient states on the 19th he received a flu shot. States she was doing okay until a few days later he started having cough, congestion and shortness of breath. States that he had a Covid test and it was negative. However few days later he went to the doctor, had a chest x-ray and was told he had pneumonia. He is completed his antibiotics however symptoms are to get worse again last Friday, October 23, he had a repeat Covid test and it was positive. Cardiovascular: Negative for chest pain. He denies any chest pain or pleuritic chest pain associated with the cough and shortness of breath   Gastrointestinal: Negative for abdominal pain, diarrhea, nausea and vomiting. Genitourinary: Negative for difficulty urinating, dysuria, frequency and hematuria. Musculoskeletal: Negative for back pain. Skin: Negative for color change. Neurological: Positive for weakness. Negative for numbness and headaches. Patient does report feeling weak and fatigued however he denies any headache neck pain or neck stiffness, no one-sided weakness or neglect, no falls or injuries     Positives and Pertinent negatives as per HPI. Except as noted above in the ROS, problem specific ROS was completed and is negative. Physical Exam:  Physical Exam  Vitals signs and nursing note reviewed. Constitutional:       Appearance: He is well-developed. He is not diaphoretic. HENT:      Head: Normocephalic. Right Ear: External ear normal.      Left Ear: External ear normal.   Eyes:      General: No scleral icterus. Right eye: No discharge. Left eye: No discharge. Neck:      Musculoskeletal: Normal range of motion and neck supple. Cardiovascular:      Rate and Rhythm: Tachycardia present. Comments: Normal S1 and 2, peripheral pulses are 2+, he is have a regular rhythm as his heart rate is 130 bpm during my bedside exam, appears the patient has atrial fibrillation  Pulmonary:      Effort: Pulmonary effort is normal. No respiratory distress. Breath sounds: Normal breath sounds. Comments: Airway patent with symmetric rise and fall of chest, lung fields are clear anteriorly, posteriorly he has some expiratory wheezing however he is not tachypneic or dyspneic and saturations are 93 to 95% on room air. Abdominal:      Palpations: Abdomen is soft. Musculoskeletal: Normal range of motion. Skin:     General: Skin is warm. Capillary Refill: Capillary refill takes less than 2 seconds. Coloration: Skin is not pale. Neurological:      General: No focal deficit present. Mental Status: He is alert and oriented to person, place, and time. GCS: GCS eye subscore is 4. GCS verbal subscore is 5. GCS motor subscore is 6.    Psychiatric:         Mood and Affect: Mood normal.         Behavior: Behavior normal.         MEDICAL DECISION MAKING    Vitals:    Vitals:    10/31/20 1615 10/31/20 1630 10/31/20 1645 10/31/20 1653   BP: 95/66 (!) 97/54 118/67 110/69   Pulse: 96 94 106 106   Resp: 24 19 25 24   Temp:    98.3 °F (36.8 °C)   TempSrc:    Oral   SpO2: 95% 96% 94% 94%   Weight:       Height:           LABS:  Labs Reviewed   CBC WITH AUTO DIFFERENTIAL - Abnormal; Notable for the following components:       Result Value    RDW 16.9 (*)     Lymphocytes Absolute 0.7 (*)     All other components within normal limits    Narrative:     Performed at:  [address] [address]   Phone [phone]   COMPREHENSIVE METABOLIC PANEL W/ REFLEX TO MG FOR LOW K - Abnormal; Notable for the following components:    Glucose 203 (*)     BUN 37 (*)     CREATININE 1.5 (*)     GFR Non- 44 (*)     GFR  54 (*)     ALT 69 (*)     AST 75 (*)     All other components within normal limits    Narrative:     Performed at:  [facility]  [address] [employer]   Phone [phone]   BRAIN NATRIURETIC PEPTIDE - Abnormal; Notable for the following components:    Pro-BNP 1,255 (*)     All other components within normal limits    Narrative:     Performed at:  [address] [facility] [employer]   Phone [phone]   BLOOD GAS, VENOUS - Abnormal; Notable for the following components:    HCO3, Venous 31 (*)     All other components within normal limits    Narrative:     Performed at:  [address] [facility] [employer] [address]   Phone [phone]   URINE RT REFLEX TO CULTURE - Abnormal; Notable for the following components:    Glucose, Ur >=1000 (*)     All other components within normal limits    Narrative:     Performed at:  [address] [facility] [employer]   Phone [phone]   PROTIME-INR - Abnormal; Notable for the following components:    Protime 33.0 (*)     INR 2.82 (*)     All other components within normal limits    Narrative:     Performed at:  [address] [facility] [employer]   Phone [phone] - Abnormal; Notable for the following components:    SARS-CoV-2, NAAT DETECTED (*)     All other components within normal limits    Narrative:     ORDER WAS CANCELLED 10/31/2020 13:17, Cancelled: In-house testing.   Performed at:  [address] [facility] [employer] [address]   Phone [phone]   CULTURE, BLOOD 1   CULTURE, BLOOD 2   TROPONIN    Narrative:     Performed at:  [facility]  [address] [place] [facility] [address]   Phone [phone]   LACTIC ACID, PLASMA    Narrative:     Performed at:  [facility]  [address] [place] [facility] [address]   Phone [phone]   PROTIME-INR        Remainder of labs reviewed and werenegative at this time or not returned at the time of this note. RADIOLOGY:   Non-plain film images such as CT, Ultrasound and MRI are read by the radiologist. Patricia BRADFORD APRN - CNP have directly visualized the radiologic plain film image(s) with the below findings:        Interpretation per the Radiologist below, if available at the time of this note:    CT CHEST WO CONTRAST   Final Result   Scattered ground-glass opacities are seen throughout the lungs bilaterally,   right greater than left, predominantly peripheral in the right upper lobe. Findings are likely due to the reported history of COVID-19 pneumonia. Tiny right-sided pleural effusion         XR CHEST PORTABLE   Final Result   Moderate cardiomegaly. Slight pulmonary vascular congestion. No definite   acute airspace disease. RECOMMENDATION:   If symptoms disproportionate to radiographic findings, CT chest would be   recommended to see if early pneumonia present and obscured by vascular   congestion. MEDICAL DECISION MAKING / ED COURSE:    Because of high probability of sudden clinical deterioration of the patient's condition and risk of further deterioration, critical care time required my full attention to the patient's condition; which included chart data review, documentation, medication ordering, reviewing the patient's old records, reevaluation patient's cardiac, pulmonary and neurological status. Reevaluation of vital signs. Consultations with ED attending and admitting physician. Ordering, interpreting reviewing diagnostic testing.   Therefore a critical care time was 30 minutes of direct attention to the patient's condition did not include time spent on procedures. PROCEDURES:   Procedures    None    Patient was given:  Medications   warfarin (COUMADIN) daily dosing (placeholder) (has no administration in time range)   warfarin (COUMADIN) tablet 7.5 mg (has no administration in time range)   lactated ringers infusion 1,000 mL (0 mLs Intravenous Stopped 10/31/20 1446)   dilTIAZem injection 10 mg (10 mg Intravenous Given 10/31/20 1312)   furosemide (LASIX) injection 40 mg (40 mg Intravenous Given 10/31/20 1420)   dexamethasone (PF) (DECADRON) injection 10 mg (10 mg Intravenous Given 10/31/20 1446)   azithromycin (ZITHROMAX) 500 mg in D5W 250ml addavial (0 mg Intravenous Stopped 10/31/20 1658)   acetaminophen (TYLENOL) tablet 1,000 mg (1,000 mg Oral Given 10/31/20 1506)        Patient states on the 19th he received a flu shot. States she was doing okay until a few days later he started having cough, congestion and shortness of breath. States that he had a Covid test and it was negative. However few days later he went to the doctor, had a chest x-ray and was told he had pneumonia. He is completed his antibiotics however symptoms are to get worse again last Friday, October 23, he had a repeat Covid test and it was positive. After evaluation and examination patient IV access, IV fluids, blood work, analysis, chest x-ray and EKG were ordered. Patient's EKG shows A. fib RVR, please see Dr. Madonna Gallegos EKG interpretation note. Patient is already on Coumadin, INR was obtained. Covid is positive. Blood gases normal.  Urinalysis shows no acute infection. CBC shows no sepsis or anemia. Metabolic panel shows consistency of NOREEN as her BUN is 37, creatinine 1.5 and GFR 44. Troponin is negative. BNP is 1255, on exam patient does not sound fluid overloaded he actually appears to be dry, I am concerned he is intravascularly dry and extravesically overloaded.   INR Coumadin is

## 2020-11-01 LAB
ANION GAP SERPL CALCULATED.3IONS-SCNC: 14 MMOL/L (ref 3–16)
BUN BLDV-MCNC: 46 MG/DL (ref 7–20)
CALCIUM SERPL-MCNC: 8.3 MG/DL (ref 8.3–10.6)
CHLORIDE BLD-SCNC: 101 MMOL/L (ref 99–110)
CO2: 26 MMOL/L (ref 21–32)
CREAT SERPL-MCNC: 1.3 MG/DL (ref 0.8–1.3)
ESTIMATED AVERAGE GLUCOSE: 171.4 MG/DL
GFR AFRICAN AMERICAN: >60
GFR NON-AFRICAN AMERICAN: 52
GLUCOSE BLD-MCNC: 259 MG/DL (ref 70–99)
GLUCOSE BLD-MCNC: 271 MG/DL (ref 70–99)
GLUCOSE BLD-MCNC: 273 MG/DL (ref 70–99)
GLUCOSE BLD-MCNC: 405 MG/DL (ref 70–99)
HBA1C MFR BLD: 7.6 %
HCT VFR BLD CALC: 39.1 % (ref 40.5–52.5)
HEMOGLOBIN: 13 G/DL (ref 13.5–17.5)
INR BLD: 3.31 (ref 0.86–1.14)
MCH RBC QN AUTO: 27.5 PG (ref 26–34)
MCHC RBC AUTO-ENTMCNC: 33.3 G/DL (ref 31–36)
MCV RBC AUTO: 82.8 FL (ref 80–100)
PDW BLD-RTO: 17 % (ref 12.4–15.4)
PERFORMED ON: ABNORMAL
PLATELET # BLD: 140 K/UL (ref 135–450)
PMV BLD AUTO: 9.1 FL (ref 5–10.5)
POTASSIUM REFLEX MAGNESIUM: 4 MMOL/L (ref 3.5–5.1)
PROTHROMBIN TIME: 38.9 SEC (ref 10–13.2)
RBC # BLD: 4.73 M/UL (ref 4.2–5.9)
SODIUM BLD-SCNC: 141 MMOL/L (ref 136–145)
WBC # BLD: 3.5 K/UL (ref 4–11)

## 2020-11-01 PROCEDURE — 80048 BASIC METABOLIC PNL TOTAL CA: CPT

## 2020-11-01 PROCEDURE — 2580000003 HC RX 258: Performed by: INTERNAL MEDICINE

## 2020-11-01 PROCEDURE — 99223 1ST HOSP IP/OBS HIGH 75: CPT | Performed by: INTERNAL MEDICINE

## 2020-11-01 PROCEDURE — 85027 COMPLETE CBC AUTOMATED: CPT

## 2020-11-01 PROCEDURE — 94640 AIRWAY INHALATION TREATMENT: CPT

## 2020-11-01 PROCEDURE — 2060000000 HC ICU INTERMEDIATE R&B

## 2020-11-01 PROCEDURE — 6360000002 HC RX W HCPCS: Performed by: INTERNAL MEDICINE

## 2020-11-01 PROCEDURE — 6370000000 HC RX 637 (ALT 250 FOR IP): Performed by: INTERNAL MEDICINE

## 2020-11-01 PROCEDURE — 2500000003 HC RX 250 WO HCPCS: Performed by: INTERNAL MEDICINE

## 2020-11-01 PROCEDURE — 83036 HEMOGLOBIN GLYCOSYLATED A1C: CPT

## 2020-11-01 PROCEDURE — 2700000000 HC OXYGEN THERAPY PER DAY

## 2020-11-01 PROCEDURE — XW033E5 INTRODUCTION OF REMDESIVIR ANTI-INFECTIVE INTO PERIPHERAL VEIN, PERCUTANEOUS APPROACH, NEW TECHNOLOGY GROUP 5: ICD-10-PCS | Performed by: INTERNAL MEDICINE

## 2020-11-01 PROCEDURE — 85610 PROTHROMBIN TIME: CPT

## 2020-11-01 RX ORDER — DIGOXIN 250 MCG
250 TABLET ORAL DAILY
Status: DISCONTINUED | OUTPATIENT
Start: 2020-11-02 | End: 2020-11-03 | Stop reason: HOSPADM

## 2020-11-01 RX ORDER — DIGOXIN 0.25 MG/ML
500 INJECTION INTRAMUSCULAR; INTRAVENOUS ONCE
Status: COMPLETED | OUTPATIENT
Start: 2020-11-01 | End: 2020-11-01

## 2020-11-01 RX ADMIN — INSULIN LISPRO 6 UNITS: 100 INJECTION, SOLUTION INTRAVENOUS; SUBCUTANEOUS at 17:35

## 2020-11-01 RX ADMIN — INSULIN LISPRO 3 UNITS: 100 INJECTION, SOLUTION INTRAVENOUS; SUBCUTANEOUS at 12:54

## 2020-11-01 RX ADMIN — TORSEMIDE 40 MG: 20 TABLET ORAL at 09:18

## 2020-11-01 RX ADMIN — ATORVASTATIN CALCIUM 20 MG: 20 TABLET, FILM COATED ORAL at 20:06

## 2020-11-01 RX ADMIN — METOPROLOL SUCCINATE 150 MG: 50 TABLET, EXTENDED RELEASE ORAL at 09:18

## 2020-11-01 RX ADMIN — DIGOXIN 500 MCG: 0.25 INJECTION INTRAMUSCULAR; INTRAVENOUS at 12:59

## 2020-11-01 RX ADMIN — INSULIN LISPRO 5 UNITS: 100 INJECTION, SOLUTION INTRAVENOUS; SUBCUTANEOUS at 17:36

## 2020-11-01 RX ADMIN — TAMSULOSIN HYDROCHLORIDE 0.4 MG: 0.4 CAPSULE ORAL at 20:06

## 2020-11-01 RX ADMIN — INSULIN LISPRO 6 UNITS: 100 INJECTION, SOLUTION INTRAVENOUS; SUBCUTANEOUS at 09:00

## 2020-11-01 RX ADMIN — INSULIN LISPRO 3 UNITS: 100 INJECTION, SOLUTION INTRAVENOUS; SUBCUTANEOUS at 23:18

## 2020-11-01 RX ADMIN — INSULIN GLARGINE 10 UNITS: 100 INJECTION, SOLUTION SUBCUTANEOUS at 23:18

## 2020-11-01 RX ADMIN — DEXAMETHASONE SODIUM PHOSPHATE 6 MG: 10 INJECTION, SOLUTION INTRAMUSCULAR; INTRAVENOUS at 20:06

## 2020-11-01 RX ADMIN — BUDESONIDE AND FORMOTEROL FUMARATE DIHYDRATE 2 PUFF: 160; 4.5 AEROSOL RESPIRATORY (INHALATION) at 07:47

## 2020-11-01 RX ADMIN — Medication 10 ML: at 20:06

## 2020-11-01 RX ADMIN — INSULIN LISPRO 12 UNITS: 100 INJECTION, SOLUTION INTRAVENOUS; SUBCUTANEOUS at 12:53

## 2020-11-01 RX ADMIN — BUDESONIDE AND FORMOTEROL FUMARATE DIHYDRATE 2 PUFF: 160; 4.5 AEROSOL RESPIRATORY (INHALATION) at 20:01

## 2020-11-01 RX ADMIN — REMDESIVIR 100 MG: 100 INJECTION, POWDER, LYOPHILIZED, FOR SOLUTION INTRAVENOUS at 17:34

## 2020-11-01 ASSESSMENT — PAIN SCALES - GENERAL
PAINLEVEL_OUTOF10: 0
PAINLEVEL_OUTOF10: 0

## 2020-11-01 NOTE — CONSULTS
Clinical Pharmacy Note  Warfarin Consult    Swetha Valera is a 80 y.o. male receiving warfarin managed by pharmacy. Patient being bridged with none. Warfarin Indication: afib  Target INR range: 2-3   Dose prior to admission: 7.5mg qd    Current warfarin drug-drug interactions: dexamethasone    Recent Labs     10/31/20  1242 11/01/20  0515   HGB 13.5 13.0*   HCT 41.3 39.1*   INR 2.82* 3.31*       Assessment/Plan:    Hold Warfarin tonight. Daily PT/INR until stable within therapeutic range. Thank you for the consult. Will continue to follow.   Electronically signed by Jose Chowdhury, [facility] on 11/1/2020 at 10:26 AM

## 2020-11-01 NOTE — PROGRESS NOTES
Hospitalist Progress Note      PCP: Denae Donald    Chief Complaint. Presented to hospital for decreased appetite not feeling well    Date of Admission: 10/31/2020      Subjective:  denies chest pain, nausea, vomiting, shortness of breath, fever or chills. mention feels overall better and he has more energy today    Medications:  Reviewed    Infusion Medications    dextrose      dilTIAZem Stopped (11/01/20 1245)     Scheduled Medications    [START ON 11/2/2020] digoxin  250 mcg Oral Daily    remdesivir IVPB  100 mg Intravenous Q24H    insulin lispro  5 Units Subcutaneous TID WC    sodium chloride flush  10 mL Intravenous 2 times per day    warfarin (COUMADIN) daily dosing (placeholder)   Other RX Placeholder    atorvastatin  20 mg Oral Nightly    metoprolol succinate  150 mg Oral Daily    tamsulosin  0.4 mg Oral Nightly    torsemide  40 mg Oral Daily    insulin glargine  10 Units Subcutaneous Nightly    insulin lispro  0-12 Units Subcutaneous TID WC    insulin lispro  0-6 Units Subcutaneous Nightly    dexamethasone  6 mg Intravenous Q24H    budesonide-formoterol  2 puff Inhalation BID     PRN Meds: sodium chloride flush, potassium chloride, magnesium sulfate, acetaminophen **OR** acetaminophen, magnesium hydroxide, promethazine **OR** ondansetron, glucose, dextrose, glucagon (rDNA), dextrose, albuterol-ipratropium      Intake/Output Summary (Last 24 hours) at 11/1/2020 1626  Last data filed at 11/1/2020 1607  Gross per 24 hour   Intake 660 ml   Output 2250 ml   Net -1590 ml       Physical Exam Performed:    /60   Pulse 124   Temp 98.2 °F (36.8 °C) (Oral)   Resp 18   Ht 5' 10\" (1.778 m)   Wt 250 lb 14.1 oz (113.8 kg)   SpO2 98%   BMI 36.00 kg/m²     General appearance: No apparent distress,   HEENT:  Conjunctivae/corneas clear. Neck: Supple, with full range of motion. Respiratory:  Normal respiratory effort.  Clear to auscultation, bilaterally without Rales/Wheezes/Rhonchi. Cardiovascular: Regular rate and rhythm with normal S1/S2 without murmurs or rubs  Abdomen: Soft, non-tender, non-distended, normal bowel sounds. Musculoskeletal: No cyanosis or edema bilaterally  Neurologic:  without any focal sensory/motor deficits. grossly non-focal.  Psychiatric: Alert and oriented, Normal mood  Peripheral Pulses: +2 palpable, equal bilaterally       Labs:   Recent Labs     10/31/20  1242 11/01/20  0515   WBC 6.0 3.5*   HGB 13.5 13.0*   HCT 41.3 39.1*    140     Recent Labs     10/31/20  1242 11/01/20  0515    141   K 4.1 4.0    101   CO2 27 26   BUN 37* 46*   CREATININE 1.5* 1.3   CALCIUM 8.7 8.3     Recent Labs     10/31/20  1242   AST 75*   ALT 69*   BILITOT 0.9   ALKPHOS 75     Recent Labs     10/31/20  1242 11/01/20  0515   INR 2.82* 3.31*     Recent Labs     10/31/20  1242   TROPONINI <0.01       Urinalysis:      Lab Results   Component Value Date    NITRU Negative 10/31/2020    WBCUA 750 01/25/2018    RBCUA 4 01/25/2018    BLOODU Negative 10/31/2020    SPECGRAV 1.020 10/31/2020    GLUCOSEU >=1000 10/31/2020       Radiology:  CT CHEST WO CONTRAST   Final Result   Scattered ground-glass opacities are seen throughout the lungs bilaterally,   right greater than left, predominantly peripheral in the right upper lobe. Findings are likely due to the reported history of COVID-19 pneumonia. Tiny right-sided pleural effusion         XR CHEST PORTABLE   Final Result   Moderate cardiomegaly. Slight pulmonary vascular congestion. No definite   acute airspace disease. RECOMMENDATION:   If symptoms disproportionate to radiographic findings, CT chest would be   recommended to see if early pneumonia present and obscured by vascular   congestion.                Assessment/Plan:    Active Hospital Problems    Diagnosis    Dyspnea [R06.00]       Patient is a 66-year-old male with past medical history of COPD, diabetes mellitus, atrial fibrillation, who presents to the hospital for difficulty breathing. According to the patient he is feeling fatigued, does not have appetite for the past 10 days. He mentions this started after he got flu shot on 19/10. Patient also mentions feeling subjective fevers, he has cough-nonproductive. He also endorses shortness of breath and feeling congested.   Denies chest pain nausea vomiting diarrhea constipation.     Assessment  Generalized weakness, fatigue secondary to COVID-19 infection  Acute hypoxic respiratory failure episode in ED satting less than 90% on room air  Atrial fibrillation with RVR on Coumadin  Hyperglycemia secondary to uncontrolled diabetes  Elevated creatinine likely secondary CKD stage III-kidney function has improved  Multifocal pneumonia secondary to COVID-19 pneumonia  Diabetes mellitus  Hypertension  COPD     Plan  Continue dexamethasone, oxygen supplementation, started on remdesivir-kidney function is improved, DuoNeb, on coumadin, continue to monitor pulse oximetry  S/p IV Cardizem bolus in ED, IV Cardizem gtt, resume home Coumadin, s/p digoxin loading dose per cardiology, noted cardiology recommendations  Initiate insulin sliding scale, add 10 units Lantus nightly, 4 unitis lispro TID with meals  DVT Prophylaxis: on coumadin  Diet: DIET CARDIAC;  Code Status: Full Code    PT/OT Eval Status: ordered    Dispo -continue dexamethasone, continue oxygen supplementation, on IV Cardizem, status post digoxin loading dose per cardiology    Gricel Vizcarra MD

## 2020-11-01 NOTE — CONSULTS
updated as appropriate  family history is not on file. Medications:   sodium chloride flush, 10 mL, 2 times per day  warfarin (COUMADIN) daily dosing (placeholder), , RX Placeholder  atorvastatin, 20 mg, Nightly  metoprolol succinate, 150 mg, Daily  tamsulosin, 0.4 mg, Nightly  torsemide, 40 mg, Daily  insulin glargine, 10 Units, Nightly  insulin lispro, 0-12 Units, TID WC  insulin lispro, 0-6 Units, Nightly  dexamethasone, 6 mg, Q24H  budesonide-formoterol, 2 puff, BID       No known allergies    Allergies: Allergies   Allergen Reactions    No Known Allergies          Physical Exam/Objective:   Vitals:    11/01/20 0538   BP: 104/62   Pulse: 115   Resp: 20   Temp: 97.8 °F (36.6 °C)   SpO2: 96%       Intake/Output Summary (Last 24 hours) at 11/1/2020 0864  Last data filed at 10/31/2020 2314  Gross per 24 hour   Intake --   Output 150 ml   Net -150 ml         General appearance: male in no acute distress, comfortable, communicative and fully alert and oriented x 3. HEENT: Lips and teeth normal, no icterus, EOM intact, trachea midline. Neck : no masses, appears symmetrical and no JVD appreciated. Respiratory: Respiratory effort normal, bilateral equal chest rise. + crackles and wheezes. Cardiovascular: Ausculation shows irregular tachycardia, no edema   Abdomen: abdomen is soft, non distended, no masses, no pain with palpation.   Musculoskeletal:  no joint swelling, no deformity, strength grossly normal.   Skin: no rashes, no induration, no tightening, no jaundice   Neuro/psychiatric:  Judgement and insight is normal. Follows commands, moves all extremities spontaneously       Data:   CBC:   Recent Labs     10/31/20  1242 11/01/20 0515   WBC 6.0 3.5*   HGB 13.5 13.0*   HCT 41.3 39.1*    140     BMP:    Recent Labs     10/31/20  1242 11/01/20 0515    141   K 4.1 4.0    101   CO2 27 26   BUN 37* 46*   CREATININE 1.5* 1.3   GLUCOSE 203* 273*

## 2020-11-01 NOTE — CONSULTS
tamsulosin  0.4 mg Oral Nightly    torsemide  40 mg Oral Daily    insulin glargine  10 Units Subcutaneous Nightly    insulin lispro  0-12 Units Subcutaneous TID WC    insulin lispro  0-6 Units Subcutaneous Nightly    dexamethasone  6 mg Intravenous Q24H    budesonide-formoterol  2 puff Inhalation BID       Continuous Infusions:   dextrose      dilTIAZem 5 mg/hr (10/31/20 2025)       PRN Meds:  sodium chloride flush, potassium chloride, magnesium sulfate, acetaminophen **OR** acetaminophen, magnesium hydroxide, promethazine **OR** ondansetron, glucose, dextrose, glucagon (rDNA), dextrose, albuterol-ipratropium    ALLERGIES:  Patient is allergic to no known allergies. REVIEW OF SYSTEMS:  Constitutional: Negative for fever  HENT: Negative for sore throat  Eyes: Negative for redness   Respiratory: Negative for dyspnea, +cough  Cardiovascular: Negative for chest pain  Gastrointestinal: Negative for vomiting, diarrhea   Genitourinary: Negative for hematuria   Musculoskeletal: Negative for arthralgias   Skin: Negative for rash  Neurological: Negative for syncope  Hematological: Negative for adenopathy  Psychiatric/Behavorial: Negative for anxiety    Objective:   PHYSICAL EXAM:  Blood pressure 104/62, pulse 115, temperature 97.8 °F (36.6 °C), temperature source Axillary, resp. rate 20, height 5' 10\" (1.778 m), weight 250 lb 14.1 oz (113.8 kg), SpO2 96 %.'  Gen:  No acute distress. Eyes: PERRL. Anicteric sclera. No conjunctival injection. ENT: No discharge. Posterior oropharynx clear. External appearance of ears and nose normal.  Neck: Trachea midline. No mass   Resp:  No crackles. No wheezes. No rhonchi. No dullness on percussion. CV: Regular rate. Regular rhythm. No murmur or rub. No edema. GI: Soft, Non-tender. Non-distended. +BS  Skin: Warm, dry, w/o erythema. Lymph: No cervical or supraclavicular LAD. M/S: No cyanosis. No clubbing. Neuro:  no focal neurologic deficit.   Moves all secondary and tertiary arterial  branches  2. Cardiomegaly  3. Small right pleural effusion  4. Small pericardial effusion        The findings were discussed with Dr. Radha Romero of the emergency  department at 8-00 p.m. on 11/19/2012. Dictated on- 11/19/2012 [id number]          Electronically Read By- Ruth Brasher M.D. Electronically Released By- Ruth Brasher M.D. Released Date Time- 11/19/12 2002          Frank Duran M.D.   ------------------------------------------------------------------------------       CT Chest w/o contrast:   Results for orders placed during the hospital encounter of 10/31/20   CT CHEST WO CONTRAST    Narrative EXAMINATION:  CT OF THE CHEST WITHOUT CONTRAST 10/31/2020 3:38 pm    TECHNIQUE:  CT of the chest was performed without the administration of intravenous  contrast. Multiplanar reformatted images are provided for review. Dose  modulation, iterative reconstruction, and/or weight based adjustment of the  mA/kV was utilized to reduce the radiation dose to as low as reasonably  achievable. COMPARISON:  None. HISTORY:  ORDERING SYSTEM PROVIDED HISTORY: Pulm vasc congestion? CHF? Pneumonia  TECHNOLOGIST PROVIDED HISTORY:  Reason for exam:->Pulm vasc congestion? CHF? Pneumonia  Reason for Exam: Fatigue (COVID +. dx with pneumonia last week at urgent  care. c/o feeling \"more tired\", decreased oral intake, and occ shortness of  breath. to ED for eval  Acuity: Acute  Type of Exam: Initial    FINDINGS:  Mediastinum: Thyroid gland appears normal.  Atherosclerotic change seen in  aorta. Trace pericardial fluid is seen. Coronary artery calcification is  seen. Aortic valve calcification is seen. Small hiatal hernia seen. There  is nonspecific thickening at the GE junction    Lungs/pleura: On the left, scattered ground-glass opacities are seen in the  left upper and left lower lobe. There is mild bronchiectasis, greatest  inferiorly.   No significant septal thickening on the left. No significant  pleural fluid on the left. On the right scattered peripheral ground-glass opacity seen in the right  upper lobe, right middle lobe and right lower lobe. There is mild basilar  bronchiectasis. Trace pleural fluid is seen on the right. Upper Abdomen: Right adrenal gland is normal.  Left adrenal gland is normal    Spleen is mildly enlarged. Mild stool load seen in the colon. Soft Tissues/Bones: Spurring is seen in the spine. Impression Scattered ground-glass opacities are seen throughout the lungs bilaterally,  right greater than left, predominantly peripheral in the right upper lobe. Findings are likely due to the reported history of COVID-19 pneumonia. Tiny right-sided pleural effusion         CTPA: No results found for this or any previous visit. CXR PA/LAT:   Results for orders placed during the hospital encounter of 12/14/19   XR CHEST STANDARD (2 VW)    Narrative EXAMINATION:  TWO XRAY VIEWS OF THE CHEST    12/14/2019 4:25 pm    COMPARISON:  Frontal and lateral views of the chest 01/24/2018, 06/04/2017. HISTORY:  ORDERING SYSTEM PROVIDED HISTORY: Shortness of breath  TECHNOLOGIST PROVIDED HISTORY:  Reason for exam:->Shortness of breath  Reason for Exam: Shortness of Breath (heaviness in chest. SOBE starting 2  days ago. denies cp currently. productive cough )  Acuity: Acute  Type of Exam: Initial  Relevant Medical/Surgical History: diabetic    FINDINGS:  Interval development of a small right-sided pleural effusion with likely  adjacent atelectasis. A superimposed infectious/inflammatory process can not  be entirely excluded. The cardiopericardial silhouette is again noted to be enlarged but stable. Atherosclerotic calcification of the thoracic aorta is again noted. There is no pneumothorax. Multilevel degenerative changes are present  throughout the thoracic spine. Impression 1.  Interval development of a small right-sided pleural effusion with likely  adjacent atelectasis. A superimposed infectious/inflammatory process can not  be entirely excluded. 2.  Stable cardiomegaly. CXR portable:   Results for orders placed during the hospital encounter of 10/31/20   XR CHEST PORTABLE    Narrative EXAMINATION:  ONE XRAY VIEW OF THE CHEST    10/31/2020 12:36 pm    COMPARISON:  12/14/2019    HISTORY:  ORDERING SYSTEM PROVIDED HISTORY: sob, covid  TECHNOLOGIST PROVIDED HISTORY:  Reason for exam:->sob, covid  Reason for Exam: sob, covid  Acuity: Acute  Type of Exam: Initial    FINDINGS:  The heart is moderately enlarged. The pulmonary vasculature is slightly  prominent. No definite acute airspace disease identified. No adenopathy or  pleural effusion. Impression Moderate cardiomegaly. Slight pulmonary vascular congestion. No definite  acute airspace disease. RECOMMENDATION:  If symptoms disproportionate to radiographic findings, CT chest would be  recommended to see if early pneumonia present and obscured by vascular  congestion. Pulmonary function testing  Mild-moderate restriction with diminished DLCO  +airtrapping    Assessment:     Acute Hypoxemic Respiratory Failure with SAO2 <90% on Room Air  Covid 19 Pneumonia  NOREEN - improved  DMII  Afib w/RVR  dCHF        Plan:      -agree with decadron, given improvement in renal function will consult ID for consideration of remdesivir, may be canddiate for convalescent plasma as well  -Patient seems to have pretty late onset Covid pneumonia if his symptoms related began 14 days ago, will continue to follow oxygen requirements. Wean supplemental oxygen goal sat 90%. -home torsemide, BNP elevated  -dilt gtt A. fib with RVR    DVT PPx-he is on coumadin         This note was transcribed using [id number] [employer]. Please disregard any translational errors.     Thank you for the consult    [address]  Pulmonary, Sleep and Critical Care Medicine

## 2020-11-01 NOTE — CONSULTS
[id number]  1934 November 1, 2020    Reason for Consult: Atrial fibrillation    CC: Shortness of breath    HPI:  The patient is 80 y.o. male with a past medical history significant for COPD, type 2 DM and atrial fibrillation who presented to [facility] with shortness of breath. I was asked to see him for a-fib with RVR. Mirella Carver states that he has not been feeling well for about a week or more. He has not been able to eat solid food in several weeks. He has no appetite and is nauseous. Several days ago, he was not feeling well and went to see his PCP, Dr. Jeremy Dumont. He had a COVID test that was positive. Yesterday, he was feeling very poorly with shortness of breath and had his son bring him to the hospital. He was COVID Positive in ED. He was noted to be in a-fib with RVR and placed on a Cardizem drip which is at 10mg/hr. He states that he feels a little better today. He follows with Dr. Jael Peters at [place] [id number] for his chronic atrial fibrillation and h/o CHF. He is anticoagulated with coumadin for this. Review of Systems:  Constitutional: No fatigue, weakness, night sweats or fever. HEENT: No new vision difficulties or ringing in the ears. Respiratory: No new SOB, PND, orthopnea or cough. Cardiovascular: See HPI   GI: No n/v, diarrhea, constipation, abdominal pain or changes in bowel habits. No melena, no hematochezia  : No urinary frequency, urgency, incontinence, hematuria or dysuria. Skin: No cyanosis or skin lesions. Musculoskeletal: No new muscle or joint pain. Neurological: No syncope or TIA-like symptoms.   Psychiatric: No anxiety, insomnia or depression     Past Medical History:   Diagnosis Date    Atrial fibrillation (HCC)     Cancer (HCC)     CHF (congestive heart failure) (HCC)     COPD (chronic obstructive pulmonary disease) (HCC)     Diabetes (HCC)     Melanoma of skin, site unspecified     Malignant melanoma    Pain in back     Pulmonary emboli [facility])      Past Surgical History:   Procedure Laterality Date    COLONOSCOPY      COLONOSCOPY  03/07/2018    PACEMAKER INSERTION      PACEMAKER PLACEMENT      removal    SKIN CANCER EXCISION Right     TOE SURGERY Right      History reviewed. No pertinent family history.   Social History     Tobacco Use    Smoking status: Former Smoker    Smokeless tobacco: Never Used    Tobacco comment: Quit 50 years ago   Substance Use Topics    Alcohol use: Yes     Comment: occ     Drug use: No       Allergies   Allergen Reactions    No Known Allergies      Current Facility-Administered Medications   Medication Dose Route Frequency Provider Last Rate Last Dose    insulin lispro (HUMALOG) injection vial 3 Units  3 Units Subcutaneous TID  Chuck Schmitz MD        sodium chloride flush 0.9 % injection 10 mL  10 mL Intravenous 2 times per day Chuck Schmitz MD   10 mL at 10/31/20 2024    sodium chloride flush 0.9 % injection 10 mL  10 mL Intravenous PRN Chuck Schmitz MD        potassium chloride 10 mEq/100 mL IVPB (Peripheral Line)  10 mEq Intravenous PRN Chuck Schmitz MD        magnesium sulfate 2 g in 50 mL IVPB premix  2 g Intravenous PRN Chuck Schmitz MD        acetaminophen (TYLENOL) tablet 650 mg  650 mg Oral Q6H PRN Chuck Schmitz MD        Or    acetaminophen (TYLENOL) suppository 650 mg  650 mg Rectal Q6H PRN Chuck Schmitz MD        magnesium hydroxide (MILK OF MAGNESIA) 400 MG/5ML suspension 30 mL  30 mL Oral Daily PRN Chuck Schmitz MD        promethazine (PHENERGAN) tablet 12.5 mg  12.5 mg Oral Q6H PRN Chuck Schmitz MD        Or    ondansetron (ZOFRAN) injection 4 mg  4 mg Intravenous Q6H PRN Chuck Schmitz MD        warfarin (COUMADIN) daily dosing (placeholder)   Other RX Placeholder Chuck Schmitz MD        atorvastatin (LIPITOR) tablet 20 mg  20 mg Oral Nightly Natalya Chung MD   20 mg at 10/31/20 2023    metoprolol succinate (TOPROL XL) extended release tablet 150 mg  150 mg Oral Daily Daisy Conway MD   150 mg at 11/01/20 0918    tamsulosin (FLOMAX) capsule 0.4 mg  0.4 mg Oral Nightly Daisy Conway MD   0.4 mg at 10/31/20 2023    torsemide (DEMADEX) tablet 40 mg  40 mg Oral Daily Daisy Conway MD   40 mg at 11/01/20 0918    insulin glargine (LANTUS) injection vial 10 Units  10 Units Subcutaneous Nightly Daisy Conway MD   10 Units at 10/31/20 2308    glucose (GLUTOSE) 40 % oral gel 15 g  15 g Oral PRN Daisy Conway MD        dextrose 50 % IV solution  12.5 g Intravenous PRN Daisy Conway MD        glucagon (rDNA) injection 1 mg  1 mg Intramuscular PRN Daisy Conway MD        dextrose 5 % solution  100 mL/hr Intravenous PRN Daisy Conway MD        insulin lispro (HUMALOG) injection vial 0-12 Units  0-12 Units Subcutaneous TID WC Daisy Conway MD        insulin lispro (HUMALOG) injection vial 0-6 Units  0-6 Units Subcutaneous Nightly Daisy Conway MD   2 Units at 10/31/20 2308    dilTIAZem 125 mg in dextrose 5 % 125 mL infusion  5 mg/hr Intravenous Continuous Daisy Conway MD 5 mL/hr at 10/31/20 2025 5 mg/hr at 10/31/20 2025    dexamethasone (PF) (DECADRON) injection 6 mg  6 mg Intravenous Q24H Daisy Conway MD   6 mg at 10/31/20 2024    budesonide-formoterol (SYMBICORT) 160-4.5 MCG/ACT inhaler 2 puff  2 puff Inhalation BID Daisy Conway MD   2 puff at 11/01/20 0747    albuterol-ipratropium (COMBIVENT RESPIMAT)  MCG/ACT inhaler 1 puff  1 puff Inhalation Q4H PRN Daisy Conway MD           Physical Exam:   /64   Pulse 144   Temp 98.2 °F (36.8 °C) (Oral)   Resp 16   Ht 5' 10\" (1.778 m)   Wt 250 lb 14.1 oz (113.8 kg)   SpO2 96%   BMI 36.00 kg/m²     Intake/Output Summary (Last 24 hours) at 11/1/2020 1133  Last data filed at 10/31/2020 2314  Gross per 24 hour   Intake --   Output 150 ml   Net -150 ml     Wt Readings from Last 2 Encounters:   11/01/20 250 lb 14.1 oz (113.8 kg)   12/17/19 260 lb 12.9 oz (118.3 kg)     Constitutional: He is oriented to person, place, and time. He appears well-developed and well-nourished. In no acute distress. Head: Normocephalic and atraumatic. Neck: Neck supple. No JVD present. Carotid bruit is not present. No mass and no thyromegaly present. No lymphadenopathy present. Cardiovascular: Irreg irreg, normal heart sounds and intact distal pulses. Exam reveals no gallop and no friction rub. No murmur heard. Pulmonary/Chest: Effort normal. No respiratory distress. He has no right sided wheezes and rhonchi with coarse rales. Left basilar rales. Abdominal: Soft, non-tender. Bowel sounds and aorta are normal. He exhibits no organomegaly, mass or bruit. Extremities: No edema, cyanosis, or clubbing. Pulses are 2+ radial/carotid/dorsalis pedis and posterior tibial bilaterally. Neurological: He is alert and oriented to person, place, and time. He has normal reflexes. No cranial nerve deficit. Coordination normal.   Skin: Skin is warm and dry. There is no rash or diaphoresis. Psychiatric: He has a normal mood and affect. His speech is normal and behavior is normal.     EKG Interpretation: Atrial fibrillation with RVR, RBBB    Lab Review:   Lab Results   Component Value Date    TRIG 123 12/15/2019    HDL 35 12/15/2019    LDLCALC 9 12/15/2019    LABVLDL 25 12/15/2019     Lab Results   Component Value Date     11/01/2020    K 4.0 11/01/2020    BUN 46 11/01/2020    CREATININE 1.3 11/01/2020     Recent Labs     10/31/20  1242 11/01/20  0515   WBC 6.0 3.5*   HGB 13.5 13.0*   HCT 41.3 39.1*    140     COVID-19 Positive    Echo 12/17/19:   Ejection fraction is visually estimated to be 55-60%. Aortic valve appears sclerotic but opens adequately. Limited echo with bubble study done and appears to be negative. Assessment:  1. Atrial fibrillation with RVR  2. COVID-19 Infection  3. Chronic Diastolic CHF, class 2      Plan:  Mr. Janee Joe appears be doing better at present. He remains on the Cardizem drip at 10mg/hr.  Rapid ventricular response is what I would expect in a chronic atrial fibrillation patient with a COVID infection. I don't suspect that he has much in the way of volume overload, especially in light of his decreased oral intake over the last few weeks. I would control his heart rate with digoxin at present along with the 150mg of Toprol XL. This is a high dose of beta blockade but may indicate tat rate control will be difficult to achieve in the short term. He does not need a repeat echo right now. He may additionally need some IV diuresis later but we can assess this as his hospitalization progresses. Give a dose of IV digoxin 500mcg once now followed by oral 250mcg daily. I would not expect that he will need this to go home on. It can be beneficial for rate control in patients that are sedentary but lass effective with exertion. Continue anticoagulation for him with coumadin.

## 2020-11-01 NOTE — CONSULTS
Medications:    Outpatient Medications Marked as Taking for the 10/31/20 encounter [facility] Encounter)   Medication Sig Dispense Refill    empagliflozin (JARDIANCE) 25 MG tablet Take 25 mg by mouth daily          Allergies:  No known allergies    Immunizations :   Immunization History   Administered Date(s) Administered    Influenza Vaccine, unspecified formulation 09/01/2017    Pneumococcal Conjugate 13-valent (Rosellen Sill) 01/25/2018         Social History:    Social History     Tobacco Use    Smoking status: Former Smoker    Smokeless tobacco: Never Used    Tobacco comment: Quit 50 years ago   Substance Use Topics    Alcohol use: Yes     Comment: occ     Drug use: No     Social History     Tobacco Use   Smoking Status Former Smoker   Smokeless Tobacco Never Used   Tobacco Comment    Quit 50 years ago      Family History : no DVT no COPD      REVIEW OF SYSTEMS:    Constitutional:  fevers+  chills, night sweats  Eyes:  negative for blurred vision, eye discharge, visual disturbance   HEENT:  negative for hearing loss, ear drainage,nasal congestion  Respiratory:    cough+ , shortness of breath+  or hemoptysis   Cardiovascular:  negative for chest pain, palpitations, syncope  Gastrointestinal:  negative for nausea, vomiting, diarrhea, constipation, abdominal pain poor appetite +   Genitourinary:  negative for frequency, dysuria, urinary incontinence, hematuria  Hematologic/Lymphatic:  negative for easy bruising, bleeding and lymphadenopathy  Allergic/Immunologic:  negative for recurrent infections, angioedema, anaphylaxis   Endocrine:  negative for weight changes, polyuria, polydipsia and polyphagia  Musculoskeletal:  negative for joint  pain, swelling, decreased range of motion  Integumentary: No rashes, skin lesions  Neurological:  negative for headaches, slurred speech, unilateral weakness  Psychiatric: negative for hallucinations,confusion,agitation.      PHYSICAL EXAM:      Vitals:    /64   Pulse 144 Temp 98.2 °F (36.8 °C) (Oral)   Resp 16   Ht 5' 10\" (1.778 m)   Wt 250 lb 14.1 oz (113.8 kg)   SpO2 96%   BMI 36.00 kg/m²     PHYSICAL EXAM:     In-person bedside physical examination deferred. Pursuant to the emergency declaration under the [address] [place] [address] and the [employer] Act, this clinical encounter was conducted to provide necessary medical care. (Also consistent with new provisions and guidance offered by [facility] on March 18, 2020 in setting of COVID 19 outbreak and in order to preserve personal protective equipment in accordance with the flexibilities announced by CMS on March 30, 2020)   References: [email or web address]://[email or web address]/Resources/COVID-19/3_18%20Telemed%20Guidance%20Updated%20March%2018. pdf?[email or web address]                      [email or web address]://[email or web address]/Portals/[email or web address]/Resources/COVID-19/3_18%20Telemed%20Guidance%20Updated%20March%2018. pdf?[email or web address]                      [email or web address] pdf                              Pulmonary: deferred  Abdomen/GI: deferred  Neuro: deferred  Skin: deferred  Musculoskeletal:  deferred  Genitourinary: Deferred  Psych: deferred  Lymphatic/Immunologic: deferred         DATA:    CBC:   Lab Results   Component Value Date    WBC 3.5 (L) 11/01/2020    HGB 13.0 (L) 11/01/2020    HCT 39.1 (L) 11/01/2020    MCV 82.8 11/01/2020     11/01/2020     RENAL:   Lab Results   Component Value Date    CREATININE 1.3 11/01/2020    BUN 46 (H) 11/01/2020     11/01/2020    K 4.0 11/01/2020     11/01/2020    CO2 26 11/01/2020     SED RATE: No results found for: SEDRATE  CK: No results found for: CKTOTAL  CRP: No results found for: CRP  Hepatic Function Panel:   Lab Results   Component Value Date    ALKPHOS 75 10/31/2020    ALT 69 10/31/2020    AST 75 10/31/2020 PROT 7.4 10/31/2020    PROT 6.7 12/20/2012    BILITOT 0.9 10/31/2020    LABALBU 3.9 10/31/2020     UA:  Lab Results   Component Value Date    COLORU YELLOW 10/31/2020    CLARITYU Clear 10/31/2020    GLUCOSEU >=1000 10/31/2020    BILIRUBINUR Negative 10/31/2020    KETUA Negative 10/31/2020    SPECGRAV 1.020 10/31/2020    BLOODU Negative 10/31/2020    PHUR 5.0 10/31/2020    PROTEINU Negative 10/31/2020    UROBILINOGEN 0.2 10/31/2020    NITRU Negative 10/31/2020    LEUKOCYTESUR Negative 10/31/2020    LABMICR Not Indicated 10/31/2020    URINETYPE Cleancatch 10/31/2020      Urine Microscopic:   Lab Results   Component Value Date    LABCAST 0-1 Hyaline 06/04/2017    HYALCAST 1 01/25/2018    WBCUA 750 01/25/2018    RBCUA 4 01/25/2018    EPIU 2 01/25/2018     Urine Reflex to Culture:   Lab Results   Component Value Date    URRFLXCULT Not Indicated 10/31/2020       Creat  1.5     hbA1c  7.6   CORONAVIRUS [id number]: 10/24/2020 12:32 PM  [facility]   Component Name Value Ref Range   CORONAVIRUS SOURCE Nasopharynx     COVID19 INDICATION OUTPATIENT (AMBULATORY)     CORONAVIRUS COVID19 DETECTED (A)   Comment:  (NOTE)  INTERPRETIVE INFORMATION: SARS-CoV-2 (COVID-19) by MED[facility]  This test should be ordered for the detection of the 2019 novel       MICRO: cultures reviewed and updated by me     Procedure  Component  Value  Units  Date/Time    Culture, Blood 2 [[id number]]   Collected: 10/31/20 1458    Order Status: Sent  Specimen: Blood  Updated: 10/31/20 1505    Culture, Blood 1 [[id number]]   Collected: 10/31/20 1458    Order Status: Sent  Specimen: Blood  Updated: 10/31/20 1505      10/31/2020  1:29 PM - Boone Villalpando Incoming Lab Results From Soft (Epic Adt)           Component  Value  Ref Range & Units  Status  Collected  Lab    SARS-CoV-2, NAAT  DETECTEDAbnormal    Not Detected  Final  10/31/2020  1:14 PM  [address] Lab    Rapid NAAT:   Negative results should be treated as presumptive and,   if inconsistent with clinical signs and symptoms or necessary for   patient management, should be tested with an alternative molecular   assay. Negative results do not preclude SARS-CoV-2 infection and   should not be used as the sole basis for patient management decisions       Blood Culture:   Lab Results   Component Value Date    BC No growth after 5 days of incubation. 01/24/2018    BLOODCULT2 No growth after 5 days of incubation. 01/24/2018       Viral Culture:    Lab Results   Component Value Date    COVID19 DETECTED 10/31/2020     Urine Culture: No results for input(s): LABURIN in the last 72 hours. Scheduled Meds:   insulin lispro  3 Units Subcutaneous TID WC    sodium chloride flush  10 mL Intravenous 2 times per day    warfarin (COUMADIN) daily dosing (placeholder)   Other RX Placeholder    atorvastatin  20 mg Oral Nightly    metoprolol succinate  150 mg Oral Daily    tamsulosin  0.4 mg Oral Nightly    torsemide  40 mg Oral Daily    insulin glargine  10 Units Subcutaneous Nightly    insulin lispro  0-12 Units Subcutaneous TID WC    insulin lispro  0-6 Units Subcutaneous Nightly    dexamethasone  6 mg Intravenous Q24H    budesonide-formoterol  2 puff Inhalation BID       Continuous Infusions:   dextrose      dilTIAZem 5 mg/hr (10/31/20 2025)       PRN Meds:  sodium chloride flush, potassium chloride, magnesium sulfate, acetaminophen **OR** acetaminophen, magnesium hydroxide, promethazine **OR** ondansetron, glucose, dextrose, glucagon (rDNA), dextrose, albuterol-ipratropium    Imaging:   CT CHEST WO CONTRAST   Final Result   Scattered ground-glass opacities are seen throughout the lungs bilaterally,   right greater than left, predominantly peripheral in the right upper lobe. Findings are likely due to the reported history of COVID-19 pneumonia. Tiny right-sided pleural effusion         XR CHEST PORTABLE   Final Result   Moderate cardiomegaly. Slight pulmonary vascular congestion. No definite   acute airspace disease. RECOMMENDATION:   If symptoms disproportionate to radiographic findings, CT chest would be   recommended to see if early pneumonia present and obscured by vascular   congestion. Increased opacity in the right middle lobe occupying the right cardiophrenic angle. Correlation with current chest CT recommended for additional characterization   Result Narrative   HISTORY: Adverse effect of unspecified drugs, medicaments and biological substances, initial encounter;Myalgia, other site  COMPARISON: October 13, 2014  NOTE:  If there are questions about the content of this report, please contact [facility] radiology by calling [phone]     All pertinent images and reports for the current Hospitalization were reviewed by me. IMPRESSION:    Patient Active Problem List   Diagnosis    COPD exacerbation ([facility] [address]    Chest pain    Acute on chronic diastolic CHF (congestive heart failure) (HCC)    Exertional dyspnea    Atrial fibrillation, chronic (HCC)    Dyspnea     COVID 19 Pneumonia  Acute Hypoxic resp failure  SOB from above  Lft elevation from COVID 19  Lymphopenia from COVID 19  A FIB has PPM now with RVR   DM on insulin needs to control with steroids can get worse  COPD on inhalers exacerbation from COVID 19  NOREEN on admit now improved with hydration   Obesity BMI at 35   CT chest with multifocal PNA    Given the on going symptoms with worsening CXR would benefit from therapies for COVID  19     He has several risk factors for progression now that creat is better will give IV Remdesivir and will d/w pt about Convalescent plasma therapy if he agrees will place orders and type and screen      Labs, Microbiology, Radiology and pertinent results from current hospitalization and care every where were reviewed by me as a part of the consultation. PLAN :  1. Cont IV Dexamethasone x 6 mg NEEDs to watch BG on steroids  2. Start IV Remdesivir x 100 mg daily x 5 days no loading due to previous NOREEN  3.  CMP dailys   4. Type and screen for Plasma therapy   5. Will placed orders for RN to verify consent  6. High risk for progression    Discussed with patient/Family and Nursing   Risk of Complications/Morbidity: High      · Illness(es)/ Infection present that pose threat to bodily function. · There is potential for severe exacerbation of infection/side effects of treatment. · Therapy requires intensive monitoring for antimicrobial agent toxicity. Thanks for allowing me to participate in your patient's care please call me with any questions or concerns.     Dr. Marielos Barcenas MD  [address] [place] [address] Physician  Phone: [phone]   Fax : [phone]

## 2020-11-02 LAB
ABO/RH: NORMAL
ALBUMIN SERPL-MCNC: 3.6 G/DL (ref 3.4–5)
ALP BLD-CCNC: 71 U/L (ref 40–129)
ALT SERPL-CCNC: 70 U/L (ref 10–40)
ANION GAP SERPL CALCULATED.3IONS-SCNC: 15 MMOL/L (ref 3–16)
ANTIBODY SCREEN: NORMAL
AST SERPL-CCNC: 52 U/L (ref 15–37)
BILIRUB SERPL-MCNC: 0.6 MG/DL (ref 0–1)
BILIRUBIN DIRECT: <0.2 MG/DL (ref 0–0.3)
BILIRUBIN, INDIRECT: ABNORMAL MG/DL (ref 0–1)
BLOOD BANK DISPENSE STATUS: NORMAL
BLOOD BANK DISPENSE STATUS: NORMAL
BLOOD BANK PRODUCT CODE: NORMAL
BLOOD BANK PRODUCT CODE: NORMAL
BPU ID: NORMAL
BPU ID: NORMAL
BUN BLDV-MCNC: 51 MG/DL (ref 7–20)
CALCIUM SERPL-MCNC: 8.6 MG/DL (ref 8.3–10.6)
CHLORIDE BLD-SCNC: 100 MMOL/L (ref 99–110)
CO2: 28 MMOL/L (ref 21–32)
CREAT SERPL-MCNC: 1.4 MG/DL (ref 0.8–1.3)
DESCRIPTION BLOOD BANK: NORMAL
DESCRIPTION BLOOD BANK: NORMAL
EKG ATRIAL RATE: 85 BPM
EKG DIAGNOSIS: NORMAL
EKG Q-T INTERVAL: 382 MS
EKG QRS DURATION: 144 MS
EKG QTC CALCULATION (BAZETT): 516 MS
EKG R AXIS: 35 DEGREES
EKG T AXIS: -11 DEGREES
EKG VENTRICULAR RATE: 110 BPM
GFR AFRICAN AMERICAN: 58
GFR NON-AFRICAN AMERICAN: 48
GLUCOSE BLD-MCNC: 201 MG/DL (ref 70–99)
GLUCOSE BLD-MCNC: 231 MG/DL (ref 70–99)
GLUCOSE BLD-MCNC: 270 MG/DL (ref 70–99)
GLUCOSE BLD-MCNC: 283 MG/DL (ref 70–99)
GLUCOSE BLD-MCNC: 380 MG/DL (ref 70–99)
HCT VFR BLD CALC: 40 % (ref 40.5–52.5)
HEMOGLOBIN: 13.1 G/DL (ref 13.5–17.5)
INR BLD: 4.36 (ref 0.86–1.14)
MCH RBC QN AUTO: 27.3 PG (ref 26–34)
MCHC RBC AUTO-ENTMCNC: 32.7 G/DL (ref 31–36)
MCV RBC AUTO: 83.4 FL (ref 80–100)
PDW BLD-RTO: 16.7 % (ref 12.4–15.4)
PERFORMED ON: ABNORMAL
PLATELET # BLD: 175 K/UL (ref 135–450)
PMV BLD AUTO: 9.3 FL (ref 5–10.5)
POTASSIUM REFLEX MAGNESIUM: 4.3 MMOL/L (ref 3.5–5.1)
PROTHROMBIN TIME: 51.3 SEC (ref 10–13.2)
RBC # BLD: 4.79 M/UL (ref 4.2–5.9)
SODIUM BLD-SCNC: 143 MMOL/L (ref 136–145)
TOTAL PROTEIN: 6.8 G/DL (ref 6.4–8.2)
WBC # BLD: 7.4 K/UL (ref 4–11)

## 2020-11-02 PROCEDURE — 94640 AIRWAY INHALATION TREATMENT: CPT

## 2020-11-02 PROCEDURE — 6370000000 HC RX 637 (ALT 250 FOR IP): Performed by: INTERNAL MEDICINE

## 2020-11-02 PROCEDURE — 86901 BLOOD TYPING SEROLOGIC RH(D): CPT

## 2020-11-02 PROCEDURE — 80076 HEPATIC FUNCTION PANEL: CPT

## 2020-11-02 PROCEDURE — 97530 THERAPEUTIC ACTIVITIES: CPT

## 2020-11-02 PROCEDURE — 94761 N-INVAS EAR/PLS OXIMETRY MLT: CPT

## 2020-11-02 PROCEDURE — 80048 BASIC METABOLIC PNL TOTAL CA: CPT

## 2020-11-02 PROCEDURE — 6360000002 HC RX W HCPCS: Performed by: INTERNAL MEDICINE

## 2020-11-02 PROCEDURE — XW13325 TRANSFUSION OF CONVALESCENT PLASMA (NONAUTOLOGOUS) INTO PERIPHERAL VEIN, PERCUTANEOUS APPROACH, NEW TECHNOLOGY GROUP 5: ICD-10-PCS | Performed by: INTERNAL MEDICINE

## 2020-11-02 PROCEDURE — 36415 COLL VENOUS BLD VENIPUNCTURE: CPT

## 2020-11-02 PROCEDURE — 2060000000 HC ICU INTERMEDIATE R&B

## 2020-11-02 PROCEDURE — 86900 BLOOD TYPING SEROLOGIC ABO: CPT

## 2020-11-02 PROCEDURE — 2580000003 HC RX 258: Performed by: INTERNAL MEDICINE

## 2020-11-02 PROCEDURE — 97161 PT EVAL LOW COMPLEX 20 MIN: CPT

## 2020-11-02 PROCEDURE — 99232 SBSQ HOSP IP/OBS MODERATE 35: CPT | Performed by: INTERNAL MEDICINE

## 2020-11-02 PROCEDURE — 97165 OT EVAL LOW COMPLEX 30 MIN: CPT

## 2020-11-02 PROCEDURE — 93010 ELECTROCARDIOGRAM REPORT: CPT | Performed by: INTERNAL MEDICINE

## 2020-11-02 PROCEDURE — 2700000000 HC OXYGEN THERAPY PER DAY

## 2020-11-02 PROCEDURE — 85027 COMPLETE CBC AUTOMATED: CPT

## 2020-11-02 PROCEDURE — 86850 RBC ANTIBODY SCREEN: CPT

## 2020-11-02 PROCEDURE — 85610 PROTHROMBIN TIME: CPT

## 2020-11-02 PROCEDURE — 97116 GAIT TRAINING THERAPY: CPT

## 2020-11-02 RX ORDER — 0.9 % SODIUM CHLORIDE 0.9 %
20 INTRAVENOUS SOLUTION INTRAVENOUS ONCE
Status: COMPLETED | OUTPATIENT
Start: 2020-11-02 | End: 2020-11-03

## 2020-11-02 RX ORDER — PHYTONADIONE 5 MG/1
2.5 TABLET ORAL ONCE
Status: DISCONTINUED | OUTPATIENT
Start: 2020-11-02 | End: 2020-11-02

## 2020-11-02 RX ADMIN — TORSEMIDE 40 MG: 20 TABLET ORAL at 09:23

## 2020-11-02 RX ADMIN — INSULIN LISPRO 4 UNITS: 100 INJECTION, SOLUTION INTRAVENOUS; SUBCUTANEOUS at 17:48

## 2020-11-02 RX ADMIN — DEXAMETHASONE SODIUM PHOSPHATE 6 MG: 10 INJECTION, SOLUTION INTRAMUSCULAR; INTRAVENOUS at 20:18

## 2020-11-02 RX ADMIN — INSULIN LISPRO 5 UNITS: 100 INJECTION, SOLUTION INTRAVENOUS; SUBCUTANEOUS at 12:50

## 2020-11-02 RX ADMIN — Medication 10 ML: at 20:18

## 2020-11-02 RX ADMIN — INSULIN LISPRO 5 UNITS: 100 INJECTION, SOLUTION INTRAVENOUS; SUBCUTANEOUS at 08:00

## 2020-11-02 RX ADMIN — METOPROLOL SUCCINATE 150 MG: 50 TABLET, EXTENDED RELEASE ORAL at 09:23

## 2020-11-02 RX ADMIN — BUDESONIDE AND FORMOTEROL FUMARATE DIHYDRATE 2 PUFF: 160; 4.5 AEROSOL RESPIRATORY (INHALATION) at 09:16

## 2020-11-02 RX ADMIN — Medication 10 ML: at 09:23

## 2020-11-02 RX ADMIN — DIGOXIN 250 MCG: 250 TABLET ORAL at 09:23

## 2020-11-02 RX ADMIN — INSULIN LISPRO 6 UNITS: 100 INJECTION, SOLUTION INTRAVENOUS; SUBCUTANEOUS at 08:00

## 2020-11-02 RX ADMIN — INSULIN LISPRO 2 UNITS: 100 INJECTION, SOLUTION INTRAVENOUS; SUBCUTANEOUS at 21:45

## 2020-11-02 RX ADMIN — TAMSULOSIN HYDROCHLORIDE 0.4 MG: 0.4 CAPSULE ORAL at 20:18

## 2020-11-02 RX ADMIN — INSULIN GLARGINE 10 UNITS: 100 INJECTION, SOLUTION SUBCUTANEOUS at 21:45

## 2020-11-02 RX ADMIN — ATORVASTATIN CALCIUM 20 MG: 20 TABLET, FILM COATED ORAL at 20:18

## 2020-11-02 RX ADMIN — INSULIN LISPRO 5 UNITS: 100 INJECTION, SOLUTION INTRAVENOUS; SUBCUTANEOUS at 17:49

## 2020-11-02 RX ADMIN — INSULIN LISPRO 10 UNITS: 100 INJECTION, SOLUTION INTRAVENOUS; SUBCUTANEOUS at 12:50

## 2020-11-02 RX ADMIN — BUDESONIDE AND FORMOTEROL FUMARATE DIHYDRATE 2 PUFF: 160; 4.5 AEROSOL RESPIRATORY (INHALATION) at 19:42

## 2020-11-02 ASSESSMENT — PAIN SCALES - GENERAL
PAINLEVEL_OUTOF10: 0

## 2020-11-02 NOTE — PROGRESS NOTES
Physical Therapy    Facility/Department: [address] PROGRESSIVE CARE  Initial Assessment    NAME: Milton Kohli  : 1934  MRN: [id number]    Date of Service: 2020    Discharge Recommendations:  Continue to assess pending progress   PT Equipment Recommendations  Other: Will monitor for potential equipt needs. Assessment   Body structures, Functions, Activity limitations: Decreased functional mobility ; Decreased endurance  Assessment: 81 y/o male admit 10/31/2020 with Acute Respiratory, COVID +, A-Fib with RVR, General Weak. PMH as noted including CHF, A-Fib, COPD, PE, Skin Ca/Excision. PTA pt living alone in [facility] independent with daily care and functional mobility. Anticipate adequate progress for d/c home. Pt currently requiring O2 4L. May benefit from Home PT Evaluation to ensure safe transition home. Will monitor pt's progress. Prognosis: Good  Decision Making: Low Complexity  History: 81 y/o male admit 10/31/2020 with Acute Respiratory, COVID +, A-Fib with RVR, General Weak. PMH as noted including CHF, A-Fib, COPD, PE, Skin Ca/Excision. Exam: See above. Clinical Presentation: See above. Patient Education: Role of PT, POC, Need to call for assist, Safe use of Walker. Barriers to Learning: None. REQUIRES PT FOLLOW UP: Yes  Activity Tolerance  Activity Tolerance: Patient Tolerated treatment well;Patient limited by endurance       Patient Diagnosis(es): The primary encounter diagnosis was Acute respiratory failure due to COVID-19 [facility]). Diagnoses of Atrial fibrillation with RVR ([address] and General weakness were also pertinent to this visit. has a past medical history of Atrial fibrillation ([address] Cancer ([address] CHF (congestive heart failure) ([address] COPD (chronic obstructive pulmonary disease) ([address] Diabetes ([address] Melanoma of skin, site unspecified, Pain in back, and Pulmonary emboli ([address]    has a past surgical history that includes Pacemaker insertion; pacemaker placement; Skin cancer excision (Right); Colonoscopy; Toe Surgery (Right); and Colonoscopy (03/07/2018). Restrictions  Restrictions/Precautions  Restrictions/Precautions: Fall Risk  Position Activity Restriction  Other position/activity restrictions: Droplet Plus : COVID +. O2 4L via NC. Vision/Hearing  Vision: Impaired  Vision Exceptions: Wears glasses for reading  Hearing: Within functional limits     Subjective  General  Chart Reviewed: Yes  Patient assessed for rehabilitation services?: Yes  Additional Pertinent Hx: 79 y/o male admit 10/31/2020 with Acute Respiratory, COVID +, A-Fib with RVR, General Weak. PMH as noted including CHF, A-Fib, COPD, PE, Skin Ca/Excision. Family / Caregiver Present: No  Referring Practitioner: Dr. Mami Fragoso. Diagnosis: Acute Respiratory, COVID +, A-Fib with RVR, General Weak. Follows Commands: Within Functional Limits  Subjective  Subjective: Pt agreeable to PT Eval/Rx. Pt requests ability to amb to bathroom and wash face, hands. ..   Pain Screening  Patient Currently in Pain: Denies          Orientation  Orientation  Overall Orientation Status: Within Functional Limits  Social/Functional History  Social/Functional History  Lives With: Alone  Type of Home: (Condo.)  Home Layout: One level  Home Access: Stairs to enter without rails(1 step to enter.)  Entrance Stairs - Number of Steps: 1  Bathroom Shower/Tub: Walk-in shower  Bathroom Toilet: Handicap height  Bathroom Equipment: Grab bars in shower, Built-in shower seat, Grab bars around toilet  Bathroom Accessibility: Accessible  Home Equipment: Rolling walker, Cane, [employer], Crutches  ADL Assistance: Independent  Homemaking Assistance: Independent  Homemaking Responsibilities: Yes  Ambulation Assistance: Independent(Without assist device pta.)  Transfer Assistance: Independent  Active : Yes  Occupation: Retired  Cognition   Cognition  Overall Cognitive Status: WFL    Objective          AROM RLE (degrees)  RLE AROM: WFL  AROM LLE (degrees)  LLE AROM : WFL  AROM RUE (degrees)  RUE AROM : WFL  AROM LUE (degrees)  LUE AROM : WFL  Strength RLE  Strength RLE: WFL  Strength LLE  Strength LLE: WFL  Strength RUE  Strength RUE: WFL  Strength LUE  Strength LUE: WFL        Bed mobility  Supine to Sit: Supervision  Sit to Supine: Supervision  Transfers  Sit to Stand: Stand by assistance  Stand to sit: Stand by assistance  Ambulation  Ambulation?: Yes  Ambulation 1  Surface: level tile  Device: Rolling Walker  Distance: Pt amb within hospital room setting, to/from bathroom with Walker SBA. No LE buckling/giving way. Fatigued following although sats remain at least 90%. Comments: Upon arrival at bathroom, pt request ability to get cleaned up (PCA unavailable). PT remained with pt as he completed sponge bath as sink (sit and stand as needed), brush teeth, shave. .. Generally SBA. Clean gown, clean undergarments. .. Plan   Plan  Times per week: 3-5x week while in acute care setting. Current Treatment Recommendations: Functional Mobility Training, Transfer Training, Gait Training, Safety Education & Training, Patient/Caregiver Education & Training  Safety Devices  Type of devices: Call light within reach, Chair alarm in place, Left in chair, Nurse notified         AM-PAC Score  AM-PAC Inpatient Mobility Raw Score : 20 (11/02/20 1254)  AM-PAC Inpatient T-Scale Score : 47.67 (11/02/20 1254)  Mobility Inpatient CMS 0-100% Score: 35.83 (11/02/20 1254)  Mobility Inpatient CMS G-Code Modifier : Tony Diaz (11/02/20 1254)          Goals  Short term goals  Time Frame for Short term goals: Upon d/c acute care setting. Short term goal 1: Bed Mob Independent. Short term goal 2: Transfers with/without assist device Independent. Short term goal 3: Amb with/without assist device at least 48' Independent. Patient Goals   Patient goals : Return home.        Therapy Time   Individual Concurrent Group Co-treatment   Time In 1100         Time Out 1140

## 2020-11-02 NOTE — CARE COORDINATION
INITIAL CASE MANAGEMENT ASSESSMENT    Reviewed chart, unable to meet with patient due to isolation status. Call to patient's room, spoke with patient over the phone to assess possible discharge needs. Explained Case Management role/services. Living Situation: confirmed address, lives alone, no FIFI    ADLs: independent      DME: walker, support chair, crutches     PT/OT Recs:     Discharge Recommendations PT:  Continue to assess pending progress     Discharge Recommendations OT: Orestes Contreras scored a 21/24 on the AM-PAC ADL Inpatient form. At this time, no further OT is recommended upon discharge due to anticipate pt safe to return home with PRN assist.  Recommend patient returns to prior setting with prior services. Active Services: none      Transportation: active , family to  at discharge     Medications: [facility] on [address], no issues obtaining or taking medications     PCP: kaley Sarmiento MD      HD/PD: none    PLAN/COMMENTS: plan is to return home at discharge, open to [place] [phone] if needed    CM provided contact information for patient or family to call with any questions. CM will follow and assist as needed.     Electronically signed by MICHELLE Cosme LSW on 11/2/2020 at 2:41 PM

## 2020-11-02 NOTE — ACP (ADVANCE CARE PLANNING)
Advance Care Planning     Advance Care Planning Activator (Inpatient)  Conversation Note      Date of ACP Conversation: 11/2/2020    Conversation Conducted with: Patient with Decision Making Capacity    ACP Activator: [facility] Decision Maker:     Current Designated Health Care Decision Maker:     If no Decision Maker listed above or available through scanned documents, then:    If no Authorized Decision Maker has previously been identified, then patient chooses Health Care Decision Maker:  \"Who would you like to name as your primary health care decision-maker? \"               Name: Ai Dillon        Relationship: Son          Phone number: [phone]  Ramonanestine Sic this person be reached easily? \" Yes  \"Who would you like to name as your back-up decision maker? \"   Name: Micky Moyer        Relationship: Daughter          Phone number: [phone]  Ramonanestine Sic this person be reached easily? \" Yes    Care Preferences    Ventilation: \"If you were in your present state of health and suddenly became very ill and were unable to breathe on your own, what would your preference be about the use of a ventilator (breathing machine) if it were available to you? \"      Would the patient desire the use of ventilator (breathing machine)?: yes    \"If your health worsens and it becomes clear that your chance of recovery is unlikely, what would your preference be about the use of a ventilator (breathing machine) if it were available to you? \"     Would the patient desire the use of ventilator (breathing machine)?: Yes      Resuscitation  \"CPR works best to restart the heart when there is a sudden event, like a heart attack, in someone who is otherwise healthy. Unfortunately, CPR does not typically restart the heart for people who have serious health conditions or who are very sick. \"    \"In the event your heart stopped as a result of an underlying serious health condition, would you want attempts to be made to restart your heart (answer \"yes\" for attempt to resuscitate) or would you prefer a natural death (answer \"no\" for do not attempt to resuscitate)? \" yes     [] Yes   [x] No   Educated Patient / Rohit Beckero regarding differences between Advance Directives and portable DNR orders.     Length of ACP Conversation in minutes:      Conversation Outcomes:  [x] ACP discussion completed  [] Existing advance directive reviewed with patient; no changes to patient's previously recorded wishes  [] New Advance Directive completed  [] Portable Do Not Rescitate prepared for Provider review and signature  [] POLST/POST/MOLST/MOST prepared for Provider review and signature      Follow-up plan:    [] Schedule follow-up conversation to continue planning  [] Referred individual to Provider for additional questions/concerns   [] Advised patient/agent/surrogate to review completed ACP document and update if needed with changes in condition, patient preferences or care setting    [x] This note routed to one or more involved healthcare providers      Electronically signed by MICHELLE Lawrence, CHAVO on 11/2/2020 at 2:49 PM

## 2020-11-02 NOTE — PROGRESS NOTES
Pulmonary Progress Note    Date of Admission: 10/31/2020   LOS: 2 days     Chief Complaint   Patient presents with    Fatigue     COVID +. dx with pneumonia last week at urgent care. c/o feeling \"more tired\", decreased oral intake, and occ shortness of breath. to ED for eval        Assessment:     Acute hypoxemic respiratory failure with SPO2 less than 90% on room air  COVID-19 pneumonia  A. fib with RVR    Plan:      -Marginal improvement in O2 requirements, continue to wean goal sat 90%  -On Decadron, IV remdesivir and convalescent plasma, ID following  -On dilt drip for RVR rates better controlled, cardiology following    24 Hour Events/Subjective  No acute events overnight. Oxygen requirement weaned from 4 L to 3 L today    ROS:   No nausea  No Vomiting  No chest pain      Intake/Output Summary (Last 24 hours) at 11/2/2020 1018  Last data filed at 11/2/2020 0925  Gross per 24 hour   Intake 1140 ml   Output 3850 ml   Net -2710 ml         PHYSICAL EXAM:   Blood pressure 124/62, pulse 79, temperature 97.5 °F (36.4 °C), temperature source Oral, resp. rate 16, height 5' 10\" (1.778 m), weight 250 lb 10.6 oz (113.7 kg), SpO2 97 %.'  Gen:  No acute distress. Eyes: PERRL. Anicteric sclera. No conjunctival injection. ENT: No discharge. Posterior oropharynx clear. External appearance of ears and nose normal.  Neck: Trachea midline. No mass   Resp:  No crackles. No wheezes. No rhonchi. No dullness on percussion. CV: Regular rate. Regular rhythm. No murmur or rub. Trace edema. GI: Soft, Non-tender. Non-distended. +BS  Skin: Warm, dry, w/o erythema. Lymph: No cervical or supraclavicular LAD. M/S: No cyanosis. No clubbing. Neuro:   no focal neurologic deficit. Moves all extremities  Psych: Awake and alert, Oriented x 3. Judgement and insight appropriate. Mood stable.       Medications:    Scheduled Meds:   sodium chloride  20 mL Intravenous Once    digoxin  250 mcg Oral Daily    remdesivir IVPB  100 mg Intravenous Q24H    insulin lispro  5 Units Subcutaneous TID     sodium chloride flush  10 mL Intravenous 2 times per day    warfarin (COUMADIN) daily dosing (placeholder)   Other RX Placeholder    atorvastatin  20 mg Oral Nightly    metoprolol succinate  150 mg Oral Daily    tamsulosin  0.4 mg Oral Nightly    torsemide  40 mg Oral Daily    insulin glargine  10 Units Subcutaneous Nightly    insulin lispro  0-12 Units Subcutaneous TID WC    insulin lispro  0-6 Units Subcutaneous Nightly    dexamethasone  6 mg Intravenous Q24H    budesonide-formoterol  2 puff Inhalation BID       Continuous Infusions:   dextrose      dilTIAZem Stopped (11/01/20 1245)       PRN Meds:  sodium chloride flush, potassium chloride, magnesium sulfate, acetaminophen **OR** acetaminophen, magnesium hydroxide, promethazine **OR** ondansetron, glucose, dextrose, glucagon (rDNA), dextrose, albuterol-ipratropium    Labs reviewed:  CBC:   Recent Labs     10/31/20  1242 11/01/20  0515 11/02/20  0751   WBC 6.0 3.5* 7.4   HGB 13.5 13.0* 13.1*   HCT 41.3 39.1* 40.0*   MCV 83.2 82.8 83.4    140 175     BMP:   Recent Labs     10/31/20  1242 11/01/20  0515 11/02/20  0751    141 143   K 4.1 4.0 4.3    101 100   CO2 27 26 28   BUN 37* 46* 51*   CREATININE 1.5* 1.3 1.4*     LIVER PROFILE:   Recent Labs     10/31/20  1242   AST 75*   ALT 69*   BILITOT 0.9   ALKPHOS 75     PT/INR:   Recent Labs     10/31/20  1242 11/01/20  0515 11/02/20  0751   PROTIME 33.0* 38.9* 51.3*   INR 2.82* 3.31* 4.36*     APTT: No results for input(s): APTT in the last 72 hours. UA:  Recent Labs     10/31/20  1304   COLORU YELLOW   PHUR 5.0   CLARITYU Clear   SPECGRAV 1.020   LEUKOCYTESUR Negative   UROBILINOGEN 0.2   BILIRUBINUR Negative   BLOODU Negative   GLUCOSEU >=1000*     No results for input(s): PH, PCO2, PO2 in the last 72 hours. Films:  Radiology Review:  Pertinent images / reports were reviewed as a part of this visit.     CT Chest w/ reasonably  achievable. COMPARISON:  None. HISTORY:  ORDERING SYSTEM PROVIDED HISTORY: Pulm vasc congestion? CHF? Pneumonia  TECHNOLOGIST PROVIDED HISTORY:  Reason for exam:->Pulm vasc congestion? CHF? Pneumonia  Reason for Exam: Fatigue (COVID +. dx with pneumonia last week at urgent  care. c/o feeling \"more tired\", decreased oral intake, and occ shortness of  breath. to ED for eval  Acuity: Acute  Type of Exam: Initial    FINDINGS:  Mediastinum: Thyroid gland appears normal.  Atherosclerotic change seen in  aorta. Trace pericardial fluid is seen. Coronary artery calcification is  seen. Aortic valve calcification is seen. Small hiatal hernia seen. There  is nonspecific thickening at the GE junction    Lungs/pleura: On the left, scattered ground-glass opacities are seen in the  left upper and left lower lobe. There is mild bronchiectasis, greatest  inferiorly. No significant septal thickening on the left. No significant  pleural fluid on the left. On the right scattered peripheral ground-glass opacity seen in the right  upper lobe, right middle lobe and right lower lobe. There is mild basilar  bronchiectasis. Trace pleural fluid is seen on the right. Upper Abdomen: Right adrenal gland is normal.  Left adrenal gland is normal    Spleen is mildly enlarged. Mild stool load seen in the colon. Soft Tissues/Bones: Spurring is seen in the spine. Impression Scattered ground-glass opacities are seen throughout the lungs bilaterally,  right greater than left, predominantly peripheral in the right upper lobe. Findings are likely due to the reported history of COVID-19 pneumonia. Tiny right-sided pleural effusion         CTPA: No results found for this or any previous visit.     CXR PA/LAT:   Results for orders placed during the hospital encounter of 12/14/19   XR CHEST STANDARD (2 VW)    Narrative EXAMINATION:  TWO XRAY VIEWS OF THE CHEST    12/14/2019 4:25 pm    COMPARISON:  Frontal and lateral views of the chest 01/24/2018, 06/04/2017. HISTORY:  ORDERING SYSTEM PROVIDED HISTORY: Shortness of breath  TECHNOLOGIST PROVIDED HISTORY:  Reason for exam:->Shortness of breath  Reason for Exam: Shortness of Breath (heaviness in chest. SOBE starting 2  days ago. denies cp currently. productive cough )  Acuity: Acute  Type of Exam: Initial  Relevant Medical/Surgical History: diabetic    FINDINGS:  Interval development of a small right-sided pleural effusion with likely  adjacent atelectasis. A superimposed infectious/inflammatory process can not  be entirely excluded. The cardiopericardial silhouette is again noted to be enlarged but stable. Atherosclerotic calcification of the thoracic aorta is again noted. There is no pneumothorax. Multilevel degenerative changes are present  throughout the thoracic spine. Impression 1. Interval development of a small right-sided pleural effusion with likely  adjacent atelectasis. A superimposed infectious/inflammatory process can not  be entirely excluded. 2.  Stable cardiomegaly. CXR portable:   Results for orders placed during the hospital encounter of 10/31/20   XR CHEST PORTABLE    Narrative EXAMINATION:  ONE XRAY VIEW OF THE CHEST    10/31/2020 12:36 pm    COMPARISON:  12/14/2019    HISTORY:  ORDERING SYSTEM PROVIDED HISTORY: sob, covid  TECHNOLOGIST PROVIDED HISTORY:  Reason for exam:->sob, covid  Reason for Exam: sob, covid  Acuity: Acute  Type of Exam: Initial    FINDINGS:  The heart is moderately enlarged. The pulmonary vasculature is slightly  prominent. No definite acute airspace disease identified. No adenopathy or  pleural effusion. Impression Moderate cardiomegaly. Slight pulmonary vascular congestion. No definite  acute airspace disease. RECOMMENDATION:  If symptoms disproportionate to radiographic findings, CT chest would be  recommended to see if early pneumonia present and obscured by vascular  congestion. This note was transcribed using [id number] [employer]. Please disregard any translational errors.     Thank you for this consult,    Mickie Chin [address] [facility]

## 2020-11-02 NOTE — PROGRESS NOTES
[facility] NEPHROLOGY                                                 [phone]                                      [facility]                                        Inpatient Progress note    Summary:   Carol Go is being seen by nephrology for NOREEN. Admitted with COVID pneumonia and SOB that has not improved since first being diagnosed a few weeks prior to presenting. Interval History  Seen and examined at bedside. He is feeling better. SOB still there but improved. He worked with therapy and has [address]. Has stable left ankle edema but this is minimal and a stable issue related to prior knee replacement there. He is not having chest pain. /64  UO 3.4 L yesterday   Negative 2.3 L for the day  Na 141 >143  BUN 46 >51  Cr 1.3 > 1.4         Plan:   - he appears euvolemic, sodium rising and large negative fluid balance. Would hold torsemide for now. - strict IO and daily weights. - BP is acceptable, no changes needed. - no acute electrolyte issues. - med list reviewed, no issues. Assessment:   NOREEN on CKD stage 3a:   Has baseline Cr 1.3  Peak Cr 1.5  Low BP on admission and afib RVR so likely hemodynamic mediated hypoperfusion. S/p dose of lasix 40 mg IV and given fluids 1L  Was on an SGLT prior to admission which could cause volume depletion    Electrolytes/acid base:   No acute issues. BP/volume:   BP acceptable on metop  Had been getting home torsemide here. Will hold. Atrial fibrillation RVR:   On dilt gtt  Digoxin load  Metop 150 mg XL     Heme:  No acute issues. [facility] thanks you for the opportunity to serve this patient. Please call with any questions of concerns. Darryl Lovelace MD  [facility]  [address], [address]  Fax: [phone]  Cell: [phone]  24 hrs answering service [phone]      ROS:   [address]. All other remaining systems are negative.     Constitutional:  fever, chills, weakness, weight change, fatigue,      Skin:  rash, pruritus, hair loss, bruising, dry skin, petechiae. Head, Face, Neck   headaches, swelling,  cervical adenopathy. Respiratory: shortness of breath, cough, or wheezing  Cardiovascular: chest pain, palpitations, dizzy, edema  Gastrointestinal: nausea, vomiting, diarrhea, constipation,belly pain    Yellow skin, blood in stool  Musculoskeletal:  back pain, muscle weakness, gait problems,       joint pain or swelling. Genitourinary:  dysuria, poor urine flow, flank pain, blood in urine  Neurologic:  vertigo, TIA'S, syncope, seizures, focal weakness  Psychosocial:  insomnia, anxiety, or depression. Additional positive findings: -       Past medical history, surgical history, social history, family history are reviewed and updated as appropriate. Reviewed current medication list.     Vitals:    11/02/20 1200   BP: 128/64   Pulse: 80   Resp: 18   Temp: 97.8 °F (36.6 °C)   SpO2: 99%       General appearance: pleasant male in NAD, fully alert and oriented. Comfortable. HEENT: EOM intact, no icterus. Trachea is midline. Neck : No mass, appears symmetrical, no JVD   Respiratory: Respiratory effort appears normal, bilateral equal chest rise. Cardiovascular: Ausculation- irregular tachy+ left leg edema  Abdomen: No visible mass or tenderness, or scar, No hepatosplenomegaly  Musculoskeletal:  No clubbing,cyanosis, joints with no swelling or deformity. Skin:no rashes, ulcers, induration, no jaundice. Neuro: face symmetric, no focal deficits. Appropriate responses.  CN 2-12 grossly intact

## 2020-11-02 NOTE — PROGRESS NOTES
Occupational Therapy   Occupational Therapy Initial Assessment and Tentative D/C    This note to serve as d/c summary should pt d/c prior to next session. Date: 2020   Patient Name: Consuelo Max  MRN: [id number]     : 1934    Date of Service: 2020    Discharge Recommendations: Consuelo Max scored a 21/24 on the AM-PAC ADL Inpatient form. At this time, no further OT is recommended upon discharge due to anticipate pt safe to return home with PRN assist.  Recommend patient returns to prior setting with prior services. Home with assist PRN, Continue to assess pending progress  OT Equipment Recommendations  Equipment Needed: No  Other: pt with needed AD    Assessment   Performance deficits / Impairments: Decreased functional mobility ; Decreased strength;Decreased endurance;Decreased ADL status; Decreased balance;Decreased high-level IADLs  Assessment: This is a  80 y.o. male presents emergency department in acute respiratory distress, patient states on the  he received a flu shot. States she was doing okay until a few days later he started having cough, congestion and shortness of breath. States that he had a Covid test and it was negative. However few days later he went to the doctor, had a chest x-ray and was told he had pneumonia. He is completed his antibiotics however symptoms are to get worse again last , he had a repeat Covid test and it was positive. Patient states that over the past week he had increased shortness of breath, dry nonproductive cough, fatigue, lack of energy, no appetite, has not been able to eat or drink because he just does not feel well. He does complain of some generalized body aches however no headache neck pain or neck stiffness. No chest pain point chest pain or shortness of breath. He states he has loss of taste and no appetite, but denies any abdominal pain, no nausea vomiting or diarrhea.   He denies any pain on exam, states he feels very rundown. He denies any additional complaints, no additional aggravating relieving factors. The patient presents awake, alert, slightly dyspneic and is tachycardic as he appears in mild distress. PTA pt from home alone where he was Ind with mobility and ADLs. Pt currently functioning below baseline completing mobility and transfers with SBA and use of RW. Pt completes mobility with CGA and no device. Anticipate pt needing up to SBA for ADLs. Pt currently limited due to decresaed overall strength, endurance, and balance. Pt will benefit from skilled OT services at this time. Anticipate pt safe to return home upon D/C with progression in therapy. Prognosis: Good  Decision Making: Low Complexity  Exam: see above  Assistance / Modification: RW  OT Education: OT Role;Plan of Care;Transfer Training  REQUIRES OT FOLLOW UP: Yes  Activity Tolerance  Activity Tolerance: Patient Tolerated treatment well;Patient limited by fatigue  Safety Devices  Safety Devices in place: Yes  Type of devices: Chair alarm in place;Call light within reach;Gait belt;Nurse notified; Left in chair           Patient Diagnosis(es): The primary encounter diagnosis was Acute respiratory failure due to COVID-19 [facility]). Diagnoses of Atrial fibrillation with RVR ([facility] [address] and General weakness were also pertinent to this visit. has a past medical history of Atrial fibrillation ([address] Cancer ([address] CHF (congestive heart failure) ([address] COPD (chronic obstructive pulmonary disease) ([address] Diabetes ([address] Melanoma of skin, site unspecified, Pain in back, and Pulmonary emboli ([address] has a past surgical history that includes Pacemaker insertion; pacemaker placement; Skin cancer excision (Right); Colonoscopy; Toe Surgery (Right); and Colonoscopy (03/07/2018).            Restrictions  Restrictions/Precautions  Restrictions/Precautions: Fall Risk  Position Activity Restriction  Other position/activity restrictions: COVID-19; Droplet Plus; 4L O2    Subjective   General  Chart Reviewed: Yes  Patient assessed for rehabilitation services?: Yes  Additional Pertinent Hx: per ED note, This is a  80 y.o. male presents emergency department in acute respiratory distress, patient states on the 19th he received a flu shot. States she was doing okay until a few days later he started having cough, congestion and shortness of breath. States that he had a Covid test and it was negative. However few days later he went to the doctor, had a chest x-ray and was told he had pneumonia. He is completed his antibiotics however symptoms are to get worse again last Friday, October 23, he had a repeat Covid test and it was positive. Patient states that over the past week he had increased shortness of breath, dry nonproductive cough, fatigue, lack of energy, no appetite, has not been able to eat or drink because he just does not feel well. He does complain of some generalized body aches however no headache neck pain or neck stiffness. No chest pain point chest pain or shortness of breath. He states he has loss of taste and no appetite, but denies any abdominal pain, no nausea vomiting or diarrhea. He denies any pain on exam, states he feels very rundown. He denies any additional complaints, no additional aggravating relieving factors. The patient presents awake, alert, slightly dyspneic and is tachycardic as he appears in mild distress. Family / Caregiver Present: No  Referring Practitioner: Sravanthi Ponce MD  Subjective  Subjective: Pt supine in bed upon arrival and agreeable to OT evaluation. Pt reports general aching with no number stated. Pt on 4L O2 throughout session. General Comment  Comments: okay for therapy per RN.   Height and Weight  Weight: 250 lb 10.6 oz (113.7 kg)  Weight Method: Bed scale  BMI (Calculated): 36  Social/Functional History  Social/Functional History  Lives With: Alone  Type of Home: (condo)  Home Layout: One level  Home Access: Stairs to enter without rails  Entrance Stairs - Number of Steps: 1  Bathroom Shower/Tub: Walk-in shower  Bathroom Toilet: Handicap height  Bathroom Equipment: Grab bars in shower, Built-in shower seat, Grab bars around toilet  Home Equipment: Cane, Crutches, Rolling walker, [address]  ADL Assistance: Independent  Homemaking Assistance: Independent  Homemaking Responsibilities: Yes  Ambulation Assistance: Independent  Transfer Assistance: Independent  Active : Yes  Occupation: Retired       Objective   Vision: Impaired  Vision Exceptions: Wears glasses for reading  Hearing: Within functional limits    Orientation  Overall Orientation Status: Within Functional Limits  Orientation Level: Oriented X4     Balance  Sitting Balance: Supervision  Standing Balance: Stand by assistance(RW)  Functional Mobility  Functional - Mobility Device: Rolling Walker  Activity: To/from bathroom; Other(household distances in room)  Assist Level: Stand by assistance  Functional Mobility Comments: no overt LOB; pt completing ambulation without device with CGA; SBA with RW; SpO2 >90% throughout session  Toilet Transfers  Toilet - Technique: Ambulating(RW)  Equipment Used: Grab bars  Toilet Transfer: Stand by assistance  Wheelchair Bed Transfers  Wheelchair/Bed - Technique: Ambulating(RW)  Equipment Used: Bed;Other(bed to chair)  Level of Asssistance: Stand by assistance  ADL  Additional Comments: Anticipate pt needing up to SBA for ADLs including dressing, bathing, and toileting based on ROM, strength, and balance.   Tone RUE  RUE Tone: Normotonic  Tone LUE  LUE Tone: Normotonic  Coordination  Movements Are Fluid And Coordinated: Yes     Bed mobility  Supine to Sit: Supervision  Sit to Supine: Unable to assess  Scooting: Supervision  Comment: up in chair after session  Transfers  Sit to stand: Stand by assistance  Stand to sit: Stand by assistance  Transfer Comments: to/from RW and no device     Cognition  Overall Cognitive Status: [facility] Sensation  Overall Sensation Status: Impaired(reports neuropathy in B feet)        LUE AROM (degrees)  LUE AROM : WFL  RUE AROM (degrees)  RUE AROM : WFL  LUE Strength  Gross LUE Strength: WFL  RUE Strength  Gross RUE Strength: WFL                   Plan   Plan  Times per week: 3-5x  Current Treatment Recommendations: Functional Mobility Training, Strengthening, Gait Training, Endurance Training, Balance Training, Self-Care / ADL, Safety Education & Training, Patient/Caregiver Education & Training      AM-PAC Score        AM-PAC Inpatient Daily Activity Raw Score: 21 (11/02/20 0800)  AM-PAC Inpatient ADL T-Scale Score : 44.27 (11/02/20 0800)  ADL Inpatient CMS 0-100% Score: 32.79 (11/02/20 0800)  ADL Inpatient CMS G-Code Modifier : Soledad Hardy (11/02/20 0800)    Goals  Short term goals  Time Frame for Short term goals: prior to D/C  Short term goal 1: complete functional mobility and transfers Mod Ind  Short term goal 2: complete dressing and bathing Mod Ind  Short term goal 3: complete toileting Mod Ind  Short term goal 4: complete grooming in stance at sink Mod Ind  Short term goal 5: tolerated B UE exercises x10 reps for increased strength and endurance  Long term goals  Time Frame for Long term goals : STG=LTG  Patient Goals   Patient goals : return home       Therapy Time   Individual Concurrent Group Co-treatment   Time In 0720         Time Out 0759         Minutes 39         Timed Code Treatment Minutes: 24 Minutes(15 minute eval)       Cecilia Wells OTR/L

## 2020-11-02 NOTE — PROGRESS NOTES
[address]   Daily Progress Note      Admit Date:  10/31/2020    CC: \"  The patient is 80 y.o. male with a past medical history significant for COPD, type 2 DM and atrial fibrillation who presented to [facility] with shortness of breath. I was asked to see him for a-fib with RVR.     Josephine Villalba states that he has not been feeling well for about a week or more. He has not been able to eat solid food in several weeks. He has no appetite and is nauseous. Several days ago, he was not feeling well and went to see his PCP, Dr. Henry Mabry. He had a COVID test that was positive. Yesterday, he was feeling very poorly with shortness of breath and had his son bring him to the hospital. He was COVID Positive in ED.     He was noted to be in a-fib with RVR and placed on a Cardizem drip which is at 10mg/hr. He states that he feels a little better today. He follows with Dr[facility] Gloria Ulrich at [facility] for his chronic atrial fibrillation and h/o CHF. He is anticoagulated with coumadin for this    Subjective:  Since heart rate is much better controlled today after he received IV followed by p.o. digoxin.   He is currently afebrile, Receining plasma therapy for covid        Objective:   /64   Pulse 80   Temp 97.8 °F (36.6 °C) (Axillary)   Resp 18   Ht 5' 10\" (1.778 m)   Wt 250 lb 10.6 oz (113.7 kg)   SpO2 99%   BMI 35.97 kg/m²       Intake/Output Summary (Last 24 hours) at 11/2/2020 1247  Last data filed at 11/2/2020 1157  Gross per 24 hour   Intake 1620 ml   Output 4050 ml   Net -2430 ml     Wt Readings from Last 3 Encounters:   11/02/20 250 lb 10.6 oz (113.7 kg)   12/17/19 260 lb 12.9 oz (118.3 kg)   03/07/18 239 lb 10.2 oz (108.7 kg)     Telemetry: Atrial fibrillation with controlled ventricular rate    Physical Exam:  General:  NAD, Awake, alert and oriented X4  Skin:  Warm and dry  Neck:  Supple, no JVP appreciated, no bruit  Chest:  Clear to auscultation, no wheezes/rhonchi/rales  Cardiovascular: Irregularly irregular rate on admission has improved with beta-blocker and digoxin therapy. Likely etiology of rapid ventricular rate is patient's Covid infection  -We will continue current dose of beta-blocker and digoxin therapy  -We will obtain digoxin level tomorrow and alter dose if necessary  -Currently on chronic Coumadin therapy with INR of 4.36    Chronic diastolic heart failure. NYHA class II  -No active evidence of decompensation at the present time    Chronic kidney disease with prerenal azotemia  -BUN is up to 51. .  We will encourage patient to drink more fluids          Electronically signed by Jewels Erickson MD on 11/2/2020 at 12:47 PM

## 2020-11-02 NOTE — PLAN OF CARE
Problem: Airway Clearance - Ineffective  Goal: Achieve or maintain patent airway  Outcome: Ongoing     Problem: Gas Exchange - Impaired  Goal: Absence of hypoxia  Outcome: Ongoing     Problem: Gas Exchange - Impaired  Goal: Promote optimal lung function  Outcome: Ongoing     Problem: Breathing Pattern - Ineffective  Goal: Ability to achieve and maintain a regular respiratory rate  Outcome: Ongoing     Problem: Body Temperature -  Risk of, Imbalanced  Goal: Ability to maintain a body temperature within defined limits  Outcome: Ongoing     Problem: Body Temperature -  Risk of, Imbalanced  Goal: Will regain or maintain usual level of consciousness  Outcome: Ongoing     Problem:  Body Temperature -  Risk of, Imbalanced  Goal: Complications related to the disease process, condition or treatment will be avoided or minimized  Outcome: Ongoing     Problem: Isolation Precautions - Risk of Spread of Infection  Goal: Prevent transmission of infection  Outcome: Ongoing     Problem: Nutrition Deficits  Goal: Optimize nutrtional status  Outcome: Ongoing     Problem: Risk for Fluid Volume Deficit  Goal: Maintain normal heart rhythm  Outcome: Ongoing     Problem: Risk for Fluid Volume Deficit  Goal: Maintain absence of muscle cramping  Outcome: Ongoing     Problem: Risk for Fluid Volume Deficit  Goal: Maintain normal serum potassium, sodium, calcium, phosphorus, and pH  Outcome: Ongoing     Problem: Loneliness or Risk for Loneliness  Goal: Demonstrate positive use of time alone when socialization is not possible  Outcome: Ongoing     Problem: Fatigue  Goal: Verbalize increase energy and improved vitality  Outcome: Ongoing     Problem: Patient Education: Go to Patient Education Activity  Goal: Patient/Family Education  Outcome: Ongoing     Problem: Falls - Risk of:  Goal: Will remain free from falls  Description: Will remain free from falls  Outcome: Ongoing

## 2020-11-03 ENCOUNTER — TELEPHONE (OUTPATIENT)
Dept: PULMONOLOGY | Age: 85
End: 2020-11-03

## 2020-11-03 VITALS
HEIGHT: 70 IN | SYSTOLIC BLOOD PRESSURE: 108 MMHG | HEART RATE: 87 BPM | OXYGEN SATURATION: 94 % | DIASTOLIC BLOOD PRESSURE: 58 MMHG | RESPIRATION RATE: 16 BRPM | BODY MASS INDEX: 35.73 KG/M2 | WEIGHT: 249.56 LBS | TEMPERATURE: 97.9 F

## 2020-11-03 LAB
A/G RATIO: 1.2 (ref 1.1–2.2)
ALBUMIN SERPL-MCNC: 3.6 G/DL (ref 3.4–5)
ALP BLD-CCNC: 72 U/L (ref 40–129)
ALT SERPL-CCNC: 63 U/L (ref 10–40)
ANION GAP SERPL CALCULATED.3IONS-SCNC: 14 MMOL/L (ref 3–16)
AST SERPL-CCNC: 36 U/L (ref 15–37)
BILIRUB SERPL-MCNC: 0.7 MG/DL (ref 0–1)
BUN BLDV-MCNC: 50 MG/DL (ref 7–20)
CALCIUM SERPL-MCNC: 8.5 MG/DL (ref 8.3–10.6)
CHLORIDE BLD-SCNC: 102 MMOL/L (ref 99–110)
CO2: 26 MMOL/L (ref 21–32)
CREAT SERPL-MCNC: 1.2 MG/DL (ref 0.8–1.3)
DIGOXIN LEVEL: 0.8 NG/ML (ref 0.8–2)
GFR AFRICAN AMERICAN: >60
GFR NON-AFRICAN AMERICAN: 57
GLOBULIN: 3.1 G/DL
GLUCOSE BLD-MCNC: 202 MG/DL (ref 70–99)
GLUCOSE BLD-MCNC: 220 MG/DL (ref 70–99)
HCT VFR BLD CALC: 38.3 % (ref 40.5–52.5)
HEMOGLOBIN: 12.7 G/DL (ref 13.5–17.5)
INR BLD: 2.98 (ref 0.86–1.14)
MCH RBC QN AUTO: 27.3 PG (ref 26–34)
MCHC RBC AUTO-ENTMCNC: 33.1 G/DL (ref 31–36)
MCV RBC AUTO: 82.5 FL (ref 80–100)
PDW BLD-RTO: 16.5 % (ref 12.4–15.4)
PERFORMED ON: ABNORMAL
PLATELET # BLD: 182 K/UL (ref 135–450)
PMV BLD AUTO: 9.1 FL (ref 5–10.5)
POTASSIUM SERPL-SCNC: 3.8 MMOL/L (ref 3.5–5.1)
PROTHROMBIN TIME: 34.9 SEC (ref 10–13.2)
RBC # BLD: 4.64 M/UL (ref 4.2–5.9)
SODIUM BLD-SCNC: 142 MMOL/L (ref 136–145)
TOTAL PROTEIN: 6.7 G/DL (ref 6.4–8.2)
WBC # BLD: 6.8 K/UL (ref 4–11)

## 2020-11-03 PROCEDURE — 36415 COLL VENOUS BLD VENIPUNCTURE: CPT

## 2020-11-03 PROCEDURE — 80162 ASSAY OF DIGOXIN TOTAL: CPT

## 2020-11-03 PROCEDURE — 6370000000 HC RX 637 (ALT 250 FOR IP): Performed by: INTERNAL MEDICINE

## 2020-11-03 PROCEDURE — 85027 COMPLETE CBC AUTOMATED: CPT

## 2020-11-03 PROCEDURE — 97530 THERAPEUTIC ACTIVITIES: CPT

## 2020-11-03 PROCEDURE — 99232 SBSQ HOSP IP/OBS MODERATE 35: CPT | Performed by: INTERNAL MEDICINE

## 2020-11-03 PROCEDURE — 94761 N-INVAS EAR/PLS OXIMETRY MLT: CPT

## 2020-11-03 PROCEDURE — 94640 AIRWAY INHALATION TREATMENT: CPT

## 2020-11-03 PROCEDURE — 80053 COMPREHEN METABOLIC PANEL: CPT

## 2020-11-03 PROCEDURE — 97110 THERAPEUTIC EXERCISES: CPT

## 2020-11-03 PROCEDURE — 99231 SBSQ HOSP IP/OBS SF/LOW 25: CPT | Performed by: INTERNAL MEDICINE

## 2020-11-03 PROCEDURE — 2580000003 HC RX 258: Performed by: INTERNAL MEDICINE

## 2020-11-03 PROCEDURE — 85610 PROTHROMBIN TIME: CPT

## 2020-11-03 RX ORDER — WARFARIN SODIUM 5 MG/1
5 TABLET ORAL
Status: DISCONTINUED | OUTPATIENT
Start: 2020-11-03 | End: 2020-11-03 | Stop reason: HOSPADM

## 2020-11-03 RX ORDER — DEXAMETHASONE 0.5 MG/1
6 TABLET ORAL
Qty: 84 TABLET | Refills: 0 | Status: SHIPPED | OUTPATIENT
Start: 2020-11-03 | End: 2020-11-10

## 2020-11-03 RX ADMIN — INSULIN LISPRO 4 UNITS: 100 INJECTION, SOLUTION INTRAVENOUS; SUBCUTANEOUS at 09:55

## 2020-11-03 RX ADMIN — METOPROLOL SUCCINATE 150 MG: 50 TABLET, EXTENDED RELEASE ORAL at 09:43

## 2020-11-03 RX ADMIN — DIGOXIN 250 MCG: 250 TABLET ORAL at 09:43

## 2020-11-03 RX ADMIN — BUDESONIDE AND FORMOTEROL FUMARATE DIHYDRATE 2 PUFF: 160; 4.5 AEROSOL RESPIRATORY (INHALATION) at 09:00

## 2020-11-03 RX ADMIN — SODIUM CHLORIDE 20 ML: 9 INJECTION, SOLUTION INTRAVENOUS at 00:08

## 2020-11-03 RX ADMIN — Medication 10 ML: at 09:43

## 2020-11-03 RX ADMIN — INSULIN LISPRO 5 UNITS: 100 INJECTION, SOLUTION INTRAVENOUS; SUBCUTANEOUS at 08:00

## 2020-11-03 ASSESSMENT — PAIN SCALES - GENERAL
PAINLEVEL_OUTOF10: 0
PAINLEVEL_OUTOF10: 0

## 2020-11-03 NOTE — DISCHARGE INSTR - DIET

## 2020-11-03 NOTE — PROGRESS NOTES
Pulmonary Progress Note    Date of Admission: 10/31/2020   LOS: 3 days     Chief Complaint   Patient presents with    Fatigue     COVID +. dx with pneumonia last week at urgent care. c/o feeling \"more tired\", decreased oral intake, and occ shortness of breath. to ED for eval        Assessment:     Acute hypoxemic respiratory failure with SPO2 less than 90% on room air  COVID-19 pneumonia  A. fib with RVR    Plan:      -tolerating Room air today  -decadron/remdesivir x5d, convalescent plasma, ID following      24 Hour Events/Subjective  No acute events overnight. Weaned off oxygen this morning. ROS:   Deferred      Intake/Output Summary (Last 24 hours) at 11/3/2020 0958  Last data filed at 11/3/2020 0900  Gross per 24 hour   Intake 1522 ml   Output 3100 ml   Net -1578 ml         PHYSICAL EXAM:   Blood pressure (!) 108/58, pulse 85, temperature 97.9 °F (36.6 °C), temperature source Oral, resp. rate 16, height 5' 10\" (1.778 m), weight 249 lb 9 oz (113.2 kg), SpO2 94 %.'  Due to the current efforts to prevent transmission of COVID-19 and also the need to preserve PPE for other caregivers, a face-to-face encounter with the patient was not performed. That being said, all relevant records and diagnostic tests were reviewed, including laboratory results and imaging. Please reference any relevant documentation elsewhere. Care will be coordinated with the primary service.   Gen Deferred  CV deferred  RESP deferred  GI deferred  Neuro deferred      Medications:    Scheduled Meds:   digoxin  250 mcg Oral Daily    insulin lispro  5 Units Subcutaneous TID     sodium chloride flush  10 mL Intravenous 2 times per day    atorvastatin  20 mg Oral Nightly    metoprolol succinate  150 mg Oral Daily    tamsulosin  0.4 mg Oral Nightly    [Held by provider] torsemide  40 mg Oral Daily    insulin glargine  10 Units Subcutaneous Nightly    insulin lispro  0-12 Units Subcutaneous TID     insulin lispro  0-6 Units Subcutaneous Nightly    dexamethasone  6 mg Intravenous Q24H    budesonide-formoterol  2 puff Inhalation BID       Continuous Infusions:   dextrose         PRN Meds:  sodium chloride flush, potassium chloride, magnesium sulfate, acetaminophen **OR** acetaminophen, magnesium hydroxide, promethazine **OR** ondansetron, glucose, dextrose, glucagon (rDNA), dextrose, albuterol-ipratropium    Labs reviewed:  CBC:   Recent Labs     11/01/20 0515 11/02/20 0751 11/03/20  0617   WBC 3.5* 7.4 6.8   HGB 13.0* 13.1* 12.7*   HCT 39.1* 40.0* 38.3*   MCV 82.8 83.4 82.5    175 182     BMP:   Recent Labs     11/01/20 0515 11/02/20 0751 11/03/20  0617    143 142   K 4.0 4.3 3.8    100 102   CO2 26 28 26   BUN 46* 51* 50*   CREATININE 1.3 1.4* 1.2     LIVER PROFILE:   Recent Labs     10/31/20  1242 11/02/20 0751 11/03/20  0617   AST 75* 52* 36   ALT 69* 70* 63*   BILIDIR  --  <0.2  --    BILITOT 0.9 0.6 0.7   ALKPHOS 75 71 72     PT/INR:   Recent Labs     11/01/20 0515 11/02/20 0751 11/03/20  0617   PROTIME 38.9* 51.3* 34.9*   INR 3.31* 4.36* 2.98*     APTT: No results for input(s): APTT in the last 72 hours. UA:  Recent Labs     10/31/20  1304   COLORU YELLOW   PHUR 5.0   CLARITYU Clear   SPECGRAV 1.020   LEUKOCYTESUR Negative   UROBILINOGEN 0.2   BILIRUBINUR Negative   BLOODU Negative   GLUCOSEU >=1000*     No results for input(s): PH, PCO2, PO2 in the last 72 hours. Films:  Radiology Review:  Pertinent images / reports were reviewed as a part of this visit. CT Chest w/ contrast:   Results for orders placed during the hospital encounter of 11/19/12   CT chest with contrast    Narrative    CT CHEST WITH CONTRAST(PE PROTOCOL)     INDICATION-   CP      TECHNIQUE- Multiple axial CT images of the chest were obtained  following the administration of IV contrast. Coronal reformatted  images were obtained. COMPARISON- None     FINDINGS-  There is no thoracic adenopathy.  Small right pleural  effusion and a small pericardial effusion are present. Small  pulmonary emboli are present in a secondary branch of the right  upper lobe and multiple secondary and tertiary branches in the  left lower lobe. Calcified granulomata are noted. The lungs are  otherwise clear. Cardiomegaly is present. The visualized upper  abdomen is unremarkable. Bone windows reveal no acute osseous  abnormality. IMPRESSION-      1. Pulmonary emboli in the secondary and tertiary arterial  branches  2. Cardiomegaly  3. Small right pleural effusion  4. Small pericardial effusion        The findings were discussed with Dr. Ness Driver of the emergency  department at 8-00 p.m. on 11/19/2012. Dictated on- 11/19/2012 [id number]          Electronically Read By- Ascencion Dawson M.D. Electronically Released By- Ascencion Dawson M.D. Released Date Time- 11/19/12 2002          Terra Parra M.D.   ------------------------------------------------------------------------------       CT Chest w/o contrast:   Results for orders placed during the hospital encounter of 10/31/20   CT CHEST WO CONTRAST    Narrative EXAMINATION:  CT OF THE CHEST WITHOUT CONTRAST 10/31/2020 3:38 pm    TECHNIQUE:  CT of the chest was performed without the administration of intravenous  contrast. Multiplanar reformatted images are provided for review. Dose  modulation, iterative reconstruction, and/or weight based adjustment of the  mA/kV was utilized to reduce the radiation dose to as low as reasonably  achievable. COMPARISON:  None. HISTORY:  ORDERING SYSTEM PROVIDED HISTORY: Pulm vasc congestion? CHF? Pneumonia  TECHNOLOGIST PROVIDED HISTORY:  Reason for exam:->Pulm vasc congestion? CHF? Pneumonia  Reason for Exam: Fatigue (COVID +. dx with pneumonia last week at urgent  care. c/o feeling \"more tired\", decreased oral intake, and occ shortness of  breath.  to ED for eval  Acuity: Acute  Type of Exam: Initial    FINDINGS:  Mediastinum: Thyroid gland appears normal.  Atherosclerotic change seen in  aorta. Trace pericardial fluid is seen. Coronary artery calcification is  seen. Aortic valve calcification is seen. Small hiatal hernia seen. There  is nonspecific thickening at the GE junction    Lungs/pleura: On the left, scattered ground-glass opacities are seen in the  left upper and left lower lobe. There is mild bronchiectasis, greatest  inferiorly. No significant septal thickening on the left. No significant  pleural fluid on the left. On the right scattered peripheral ground-glass opacity seen in the right  upper lobe, right middle lobe and right lower lobe. There is mild basilar  bronchiectasis. Trace pleural fluid is seen on the right. Upper Abdomen: Right adrenal gland is normal.  Left adrenal gland is normal    Spleen is mildly enlarged. Mild stool load seen in the colon. Soft Tissues/Bones: Spurring is seen in the spine. Impression Scattered ground-glass opacities are seen throughout the lungs bilaterally,  right greater than left, predominantly peripheral in the right upper lobe. Findings are likely due to the reported history of COVID-19 pneumonia. Tiny right-sided pleural effusion         CTPA: No results found for this or any previous visit. CXR PA/LAT:   Results for orders placed during the hospital encounter of 12/14/19   XR CHEST STANDARD (2 VW)    Narrative EXAMINATION:  TWO XRAY VIEWS OF THE CHEST    12/14/2019 4:25 pm    COMPARISON:  Frontal and lateral views of the chest 01/24/2018, 06/04/2017. HISTORY:  ORDERING SYSTEM PROVIDED HISTORY: Shortness of breath  TECHNOLOGIST PROVIDED HISTORY:  Reason for exam:->Shortness of breath  Reason for Exam: Shortness of Breath (heaviness in chest. SOBE starting 2  days ago. denies cp currently.  productive cough )  Acuity: Acute  Type of Exam: Initial  Relevant Medical/Surgical History: diabetic    FINDINGS:  Interval development of a small right-sided pleural effusion with likely  adjacent atelectasis. A superimposed infectious/inflammatory process can not  be entirely excluded. The cardiopericardial silhouette is again noted to be enlarged but stable. Atherosclerotic calcification of the thoracic aorta is again noted. There is no pneumothorax. Multilevel degenerative changes are present  throughout the thoracic spine. Impression 1. Interval development of a small right-sided pleural effusion with likely  adjacent atelectasis. A superimposed infectious/inflammatory process can not  be entirely excluded. 2.  Stable cardiomegaly. CXR portable:   Results for orders placed during the hospital encounter of 10/31/20   XR CHEST PORTABLE    Narrative EXAMINATION:  ONE XRAY VIEW OF THE CHEST    10/31/2020 12:36 pm    COMPARISON:  12/14/2019    HISTORY:  ORDERING SYSTEM PROVIDED HISTORY: sob, covid  TECHNOLOGIST PROVIDED HISTORY:  Reason for exam:->sob, covid  Reason for Exam: sob, covid  Acuity: Acute  Type of Exam: Initial    FINDINGS:  The heart is moderately enlarged. The pulmonary vasculature is slightly  prominent. No definite acute airspace disease identified. No adenopathy or  pleural effusion. Impression Moderate cardiomegaly. Slight pulmonary vascular congestion. No definite  acute airspace disease. RECOMMENDATION:  If symptoms disproportionate to radiographic findings, CT chest would be  recommended to see if early pneumonia present and obscured by vascular  congestion. This note was transcribed using [id number] [employer]. Please disregard any translational errors.     Thank you for this consult,    Mickie Chin [facility]

## 2020-11-03 NOTE — PROGRESS NOTES
Physical Therapy  Facility/Department: [address] PROGRESSIVE CARE  Daily Treatment Note  NAME: Swetha Valera  : 1934  MRN: [id number]    Date of Service: 11/3/2020    Discharge Recommendations:  Patient would benefit from continued therapy after discharge, 2-3 sessions per week, Home with Home health PT   PT Equipment Recommendations  Equipment Needed: No    Assessment   Body structures, Functions, Activity limitations: Decreased functional mobility ; Decreased endurance  Assessment: Pt now on room air. He was mildly unsteady ambulating increased distance without walker, but did well to maintain SPO2 > 90%. With increased standing exercise, pt did briefly desaturate to low 80s, but quickly rebounded with standing rest break. Per RN, he is being discharged today. He would benefit from continued therapy to improve his balance and endurance. Anticipate safe return home with assist from family, regular use of RW, and home PT to address deficits. Swetha Valera scored a 20/24 on the AM-PAC short mobility form. Current research shows that an AM-PAC score of 18 or greater is typically associated with a discharge to the patient's home setting. Based on the patient's AM-PAC score and their current functional mobility deficits, it is recommended that the patient have 2-3 sessions per week of Physical Therapy at d/c to increase the patient's independence. At this time, this patient demonstrates the endurance and safety to discharge home with home PT level 1 and a follow up treatment frequency of 2-3x/wk. Please see assessment section for further patient specific details. If patient discharges prior to next session this note will serve as a discharge summary. Please see below for the latest assessment towards goals. Patient Education: Role of PT, POC, Need to call for assist, Safe use of Walker.   Activity Tolerance  Activity Tolerance: Patient Tolerated treatment well;Patient limited by endurance Patient Diagnosis(es): The primary encounter diagnosis was Acute respiratory failure due to COVID-19 [facility]). Diagnoses of Atrial fibrillation with RVR ([facility] [address] and General weakness were also pertinent to this visit. has a past medical history of Atrial fibrillation ([facility] [address] Cancer ([facility] [address] CHF (congestive heart failure) ([facility] [address] COPD (chronic obstructive pulmonary disease) ([facility] [address] Diabetes ([facility] [address] Melanoma of skin, site unspecified, Pain in back, and Pulmonary emboli ([facility] [address] has a past surgical history that includes Pacemaker insertion; pacemaker placement; Skin cancer excision (Right); Colonoscopy; Toe Surgery (Right); and Colonoscopy (03/07/2018). Restrictions  Restrictions/Precautions  Restrictions/Precautions: Fall Risk  Position Activity Restriction  Other position/activity restrictions: Droplet Plus : COVID +. Room air     Subjective   General  Chart Reviewed: Yes  Additional Pertinent Hx: 81 y/o male admit 10/31/2020 with Acute Respiratory, COVID +, A-Fib with RVR, General Weak. PMH as noted including CHF, A-Fib, COPD, PE, Skin Ca/Excision. Subjective  Subjective: Pt now on room air. Per RN he is being discharged today. He denies SOB, but does report several LOB when ambulating without walker. Orientation  Orientation  Overall Orientation Status: Within Functional Limits     Objective      Transfers  Sit to Stand: Stand by assistance  Stand to sit: Stand by assistance     Ambulation  Ambulation?: Yes  Ambulation 1  Surface: level tile  Device: No Device  Assistance: Minimal assistance;Contact guard assistance  Quality of Gait: Pt ambulated slowly without walker, experiencing multiple lateral LOB when completing R/L turns, but doing well to maintain balance during straight-line ambulation. SPO2 remained > 90% during ambulation. Distance: 150'     Balance  Comments: Static stand, EC, no UE support, x 10 s., no LOB.   Full turn in place clockwise x 1, counterclockwise x 1, no LOB, cues to maintain

## 2020-11-03 NOTE — CARE COORDINATION
INITIAL CASE MANAGEMENT ASSESSMENT     Reviewed chart, unable to meet with patient due to isolation status. Call to patient's room, spoke with patient over the phone to assess possible discharge needs. Explained Case Management role/services.      Living Situation: confirmed address, lives alone, no FIFI     ADLs: independent      DME: walker, support chair, crutches      PT/OT Recs:      Discharge Recommendations PT:  Continue to assess pending progress     Discharge Recommendations OT: Raul Byrd scored a 21/24 on the AM-PAC ADL Inpatient form.  At this time, no further OT is recommended upon discharge due to anticipate pt safe to return home with PRN assist.  Recommend patient returns to prior setting with prior services.       Active Services: none      Transportation: active , family to  at discharge     Medications: [place] [place], no issues obtaining or taking medications      PCP: confirmed Sheyla Brown MD      HD/PD: none     PLAN/COMMENTS: plan is to return home at discharge, open to [address] if needed     SW provided contact information for patient or family to call with any questions. SW will follow and assist as needed. Respectfully submitted,    TYLER Beach  [facility]   [phone]    Electronically signed by TYLER Lua on 11/3/2020 at 9:04 AM

## 2020-11-03 NOTE — PROGRESS NOTES
murmurs or rubs  Abdomen: Soft, non-tender, non-distended, normal bowel sounds. Musculoskeletal: No cyanosis or edema bilaterally  Neurologic:  without any focal sensory/motor deficits. grossly non-focal.  Psychiatric: Alert and oriented, Normal mood  Peripheral Pulses: +2 palpable, equal bilaterally       Labs:   Recent Labs     10/31/20  1242 11/01/20  0515 11/02/20  0751   WBC 6.0 3.5* 7.4   HGB 13.5 13.0* 13.1*   HCT 41.3 39.1* 40.0*    140 175     Recent Labs     10/31/20  1242 11/01/20  0515 11/02/20  0751    141 143   K 4.1 4.0 4.3    101 100   CO2 27 26 28   BUN 37* 46* 51*   CREATININE 1.5* 1.3 1.4*   CALCIUM 8.7 8.3 8.6     Recent Labs     10/31/20  1242 11/02/20  0751   AST 75* 52*   ALT 69* 70*   BILIDIR  --  <0.2   BILITOT 0.9 0.6   ALKPHOS 75 71     Recent Labs     10/31/20  1242 11/01/20  0515 11/02/20  0751   INR 2.82* 3.31* 4.36*     Recent Labs     10/31/20  1242   TROPONINI <0.01       Urinalysis:      Lab Results   Component Value Date    NITRU Negative 10/31/2020    WBCUA 750 01/25/2018    RBCUA 4 01/25/2018    BLOODU Negative 10/31/2020    SPECGRAV 1.020 10/31/2020    GLUCOSEU >=1000 10/31/2020       Radiology:  CT CHEST WO CONTRAST   Final Result   Scattered ground-glass opacities are seen throughout the lungs bilaterally,   right greater than left, predominantly peripheral in the right upper lobe. Findings are likely due to the reported history of COVID-19 pneumonia. Tiny right-sided pleural effusion         XR CHEST PORTABLE   Final Result   Moderate cardiomegaly. Slight pulmonary vascular congestion. No definite   acute airspace disease. RECOMMENDATION:   If symptoms disproportionate to radiographic findings, CT chest would be   recommended to see if early pneumonia present and obscured by vascular   congestion.                Assessment/Plan:    Active Hospital Problems    Diagnosis    Pneumonia due to COVID-19 virus [U07.1, J12.89]    Abnormal CXR

## 2020-11-03 NOTE — PLAN OF CARE
Problem: Airway Clearance - Ineffective  Goal: Achieve or maintain patent airway  11/3/2020 0038 by Janet Mccrary RN  Outcome: Ongoing     Problem: Gas Exchange - Impaired  Goal: Absence of hypoxia  11/3/2020 0038 by Janet Mccrary RN  Outcome: Ongoing     Problem: Gas Exchange - Impaired  Goal: Promote optimal lung function  11/3/2020 0038 by Janet Mccrary RN  Outcome: Ongoing     Problem: Breathing Pattern - Ineffective  Goal: Ability to achieve and maintain a regular respiratory rate  11/3/2020 0038 by Janet Mccrary RN  Outcome: Ongoing     Problem: Body Temperature -  Risk of, Imbalanced  Goal: Ability to maintain a body temperature within defined limits  11/3/2020 0038 by Janet Mccrary RN  Outcome: Ongoing     Problem: Body Temperature -  Risk of, Imbalanced  Goal: Will regain or maintain usual level of consciousness  11/3/2020 0038 by Janet Mccrary RN  Outcome: Ongoing     Problem:  Body Temperature -  Risk of, Imbalanced  Goal: Complications related to the disease process, condition or treatment will be avoided or minimized  11/3/2020 0038 by Janet Mccrary RN  Outcome: Ongoing     Problem: Isolation Precautions - Risk of Spread of Infection  Goal: Prevent transmission of infection  11/3/2020 0038 by Janet Mccrary RN  Outcome: Ongoing     Problem: Nutrition Deficits  Goal: Optimize nutrtional status  11/3/2020 0038 by Janet Mccrary RN  Outcome: Ongoing     Problem: Risk for Fluid Volume Deficit  Goal: Maintain normal heart rhythm  11/3/2020 0038 by Janet Mccrary RN  Outcome: Ongoing     Problem: Risk for Fluid Volume Deficit  Goal: Maintain absence of muscle cramping  11/3/2020 0038 by Janet Mccrary RN  Outcome: Ongoing     Problem: Risk for Fluid Volume Deficit  Goal: Maintain normal serum potassium, sodium, calcium, phosphorus, and pH  11/3/2020 0038 by Janet Mccrary RN  Outcome: Ongoing     Problem: Loneliness or Risk for Loneliness  Goal: Demonstrate positive use of time alone when socialization is not possible  11/3/2020 0038 by Melanie Jesus RN  Outcome: Ongoing     Problem: Fatigue  Goal: Verbalize increase energy and improved vitality  11/3/2020 0038 by Melanie Jesus RN  Outcome: Ongoing     Problem: Patient Education: Go to Patient Education Activity  Goal: Patient/Family Education  11/3/2020 0038 by Melanie Jesus RN  Outcome: Ongoing     Problem: Falls - Risk of:  Goal: Will remain free from falls  Description: Will remain free from falls  11/3/2020 0038 by Melanie Jesus RN  Outcome: Ongoing     Problem: Falls - Risk of:  Goal: Absence of physical injury  Description: Absence of physical injury  11/3/2020 0038 by Melanie Jesus RN  Outcome: Ongoing

## 2020-11-03 NOTE — DISCHARGE INSTR - COC
Patient Infection Status       Infection Onset Added Last Indicated Last Indicated By Review Planned Expiration Resolved Resolved By    COVID-19 10/31/20 10/31/20 10/31/20 COVID-19 11/07/20 11/14/20      Resolved    COVID-19 Rule Out 10/31/20 10/31/20 10/31/20 COVID-19 (Ordered)   10/31/20 Rule-Out Test Resulted            Nurse Assessment:  Last Vital Signs: BP (!) 108/58   Pulse 87   Temp 97.9 °F (36.6 °C) (Oral)   Resp 16   Ht 5' 10\" (1.778 m)   Wt 249 lb 9 oz (113.2 kg)   SpO2 94%   BMI 35.81 kg/m²     Last documented pain score (0-10 scale): Pain Level: 0  Last Weight:   Wt Readings from Last 1 Encounters:   11/03/20 249 lb 9 oz (113.2 kg)     Mental Status:  {IP PT MENTAL STATUS:20030:::0}    IV Access:  { MANJU IV ACCESS:383486985:::0}    Nursing Mobility/ADLs:  Walking   {CHP DME ADLs:002956918:::0}  Transfer  {CHP DME ADLs:097937480:::0}  Bathing  {CHP DME ADLs:780421519:::0}  Dressing  {CHP DME ADLs:406016594:::0}  Toileting  {CHP DME ADLs:956068570:::0}  Feeding  {CHP DME ADLs:487719839:::0}  Med Admin  {CHP DME ADLs:603931616:::0}  Med Delivery   { MANJU MED Delivery:041448071:::0}    Wound Care Documentation and Therapy:        Elimination:  Continence: Bowel: {YES / SX:15210}  Bladder: {YES / ES:29636}  Urinary Catheter: {Urinary Catheter:318572192:::0}   Colostomy/Ileostomy/Ileal Conduit: {YES / SC:59184}       Date of Last BM: ***    Intake/Output Summary (Last 24 hours) at 11/3/2020 1039  Last data filed at 11/3/2020 0900  Gross per 24 hour   Intake 1522 ml   Output 3100 ml   Net -1578 ml     I/O last 3 completed shifts:   In: Cyndia Harp [P.O.:1440; I.V.:10; Blood:412; IV Piggyback:20]  Out: 5836 [TUGZX:0193]    Safety Concerns:     508 "[employer] Safety Concerns:168597982:::0}    Impairments/Disabilities:      508 "[employer] Impairments/Disabilities:289950593:::0}    Nutrition Therapy:  Current Nutrition Therapy:   [facility] "[employer] Diet List:821186950:::0}    Routes of Feeding: {CHP DME Other Feedings:992186099:::0}  Liquids: {Slp liquid thickness:61581}  Daily Fluid Restriction: {CHP DME Yes amt example:935735374:::0}  Last Modified Barium Swallow with Video (Video Swallowing Test): {Done Not Done GMOZ:246783060:::6}    Treatments at the Time of Hospital Discharge:   Respiratory Treatments: ***  Oxygen Therapy:  {Therapy; copd oxygen:11703:::0}  Ventilator:    {[facility] Vent List:534527248:::0}    Rehab Therapies: {THERAPEUTIC INTERVENTION:1114394806}  Weight Bearing Status/Restrictions: {[facility] Weight Bearin:::0}  Other Medical Equipment (for information only, NOT a DME order):  {EQUIPMENT:321233987}  Other Treatments: ***    Patient's personal belongings (please select all that are sent with patient):  {CHP DME Belongings:510404352:::0}    RN SIGNATURE:  {Esignature:124574948:::0}    CASE MANAGEMENT/SOCIAL WORK SECTION    Inpatient Status Date: ***    Readmission Risk Assessment Score:  Readmission Risk              Risk of Unplanned Readmission:        19           Discharging to Facility/ Agency   Name:   Address:  Phone:  Fax:    Dialysis Facility (if applicable)   Name:  Address:  Dialysis Schedule:  Phone:  Fax:    / signature: {Esignature:060564737:::0}    PHYSICIAN SECTION    Prognosis: Good    Condition at Discharge: Stable    Rehab Potential (if transferring to Rehab): Good    Recommended Labs or Other Treatments After Discharge: CBC, BMP in 1 week, follow up with pulmonary and PCP in 1 week    Physician Certification: I certify the above information and transfer of Marilyn Valladares  is necessary for the continuing treatment of the diagnosis listed and that he requires Home Care for greater 30 days.      Update Admission H&P: No change in H&P    PHYSICIAN SIGNATURE:  Electronically signed by Clarisse Perez MD on 11/3/20 at 10:40 AM EST

## 2020-11-03 NOTE — PLAN OF CARE
Problem: Airway Clearance - Ineffective  Goal: Achieve or maintain patent airway  11/3/2020 1148 by Cathy Phoenix RN  Outcome: Completed     Problem: Gas Exchange - Impaired  Goal: Absence of hypoxia  11/3/2020 1148 by Cathy Phoenix RN  Outcome: Completed     Problem: Gas Exchange - Impaired  Goal: Promote optimal lung function  11/3/2020 1148 by Cathy Phoenix RN  Outcome: Completed     Problem: Breathing Pattern - Ineffective  Goal: Ability to achieve and maintain a regular respiratory rate  11/3/2020 1148 by Cathy Phoenix RN  Outcome: Completed     Problem: Body Temperature -  Risk of, Imbalanced  Goal: Ability to maintain a body temperature within defined limits  11/3/2020 1148 by Cathy Phoenix RN  Outcome: Completed     Problem: Body Temperature -  Risk of, Imbalanced  Goal: Will regain or maintain usual level of consciousness  11/3/2020 1148 by Cathy Phoenix RN  Outcome: Completed     Problem:  Body Temperature -  Risk of, Imbalanced  Goal: Complications related to the disease process, condition or treatment will be avoided or minimized  11/3/2020 1148 by Cathy Phoenix RN  Outcome: Completed     Problem: Isolation Precautions - Risk of Spread of Infection  Goal: Prevent transmission of infection  11/3/2020 1148 by Cathy Phoenix RN  Outcome: Completed     Problem: Nutrition Deficits  Goal: Optimize nutrtional status  11/3/2020 1148 by Cathy Phoenix RN  Outcome: Completed     Problem: Risk for Fluid Volume Deficit  Goal: Maintain normal heart rhythm  11/3/2020 1148 by Cathy Phoenix RN  Outcome: Completed     Problem: Risk for Fluid Volume Deficit  Goal: Maintain absence of muscle cramping  11/3/2020 1148 by Cathy Phoenix RN  Outcome: Completed     Problem: Risk for Fluid Volume Deficit  Goal: Maintain normal serum potassium, sodium, calcium, phosphorus, and pH  11/3/2020 1148 by Cathy Phoenix RN  Outcome: Completed     Problem: Loneliness or Risk for Loneliness  Goal: Demonstrate positive use of time alone when socialization is not possible  11/3/2020 1148 by Jack Quintero RN  Outcome: Completed     Problem: Fatigue  Goal: Verbalize increase energy and improved vitality  11/3/2020 1148 by Jack Quintero RN  Outcome: Completed     Problem: Patient Education: Go to Patient Education Activity  Goal: Patient/Family Education  11/3/2020 1148 by Jack Quintero RN  Outcome: Completed     Problem: Falls - Risk of:  Goal: Will remain free from falls  Description: Will remain free from falls  11/3/2020 1148 by Jack Quintero RN  Outcome: Completed     Problem: Falls - Risk of:  Goal: Absence of physical injury  Description: Absence of physical injury  11/3/2020 1148 by Jack Quintero RN  Outcome: Completed

## 2020-11-03 NOTE — PROGRESS NOTES
Clinical Pharmacy Note  Warfarin Consult    Marilyn Valladares is a 80 y.o. male receiving warfarin managed by pharmacy. Patient being bridged with none. Warfarin Indication: afib  Target INR range: 2-3   Dose prior to admission: 7.5mg daily    Current warfarin drug-drug interactions: dexamethasone    Recent Labs     11/01/20  0515 11/02/20  0751 11/03/20  0617   HGB 13.0* 13.1* 12.7*   HCT 39.1* 40.0* 38.3*   INR 3.31* 4.36* 2.98*       Assessment/Plan:  INR now therapeutic  5mg  Warfarin tonight. Daily PT/INR until stable within therapeutic range. Thank you for the consult. Will continue to follow.     Electronically signed by José Antonio Mary, [address] [facility] on 11/3/2020 at 10:05 AM

## 2020-11-03 NOTE — ACP (ADVANCE CARE PLANNING)
ADVANCED CARE PLANNING    Name:Raul Byrd       :  1934              MRN:  [id number]    Purpose of Encounter: Advanced care planning in light of acute and chronic deteriorating medical conditions. Parties in attendance: :Antonio Callejas MD ( myself )    Decisional Capacity: YES      Subjective: Patient/family understand in this voluntary conversation what inteventions and plans patient would want implemented in light of the following diagnoses:    Diagnosis: COVID-19 infection    Discussion highlights: I discussed with patient advanced directives or impending END of life event in the light of above diagnosis, we discussed the needs for possible CPR, intubation/ventilator support if needed. In such an event patient wishes to remain Full Code. Goals of Care Determinations: Patient wishes Full Code but has interest in continuing this conversation, and discussing with family before making any changes to code status and goals of care.      Code Status: Full Code    Time Spent: 17 minutes    Electronically signed by Loreta La MD on 2020 at 7:19 PM

## 2020-11-04 LAB
BLOOD CULTURE, ROUTINE: NORMAL
CULTURE, BLOOD 2: NORMAL

## 2020-11-04 NOTE — TELEPHONE ENCOUNTER
Patient is scheduled for Friday 12/4/2020   He said that when he was discharged he wasn't informed to quarantine for 14 days and is scheduled to see another doctor tomorrow but will call to cancel.

## 2020-11-14 NOTE — DISCHARGE SUMMARY
Hospital Medicine Discharge Summary    Patient ID: Gina Lin      Patient's PCP: Miguel Ellis    Admit Date: 10/31/2020     Discharge Date: 11/3/2020     Admitting Physician: Nanette Castro MD     Discharge Physician: Nanette Castro MD     Discharge Diagnoses: Active Hospital Problems    Diagnosis Date Noted    Pneumonia due to COVID-19 virus [U07.1, J12.89]     Abnormal CXR [R93.89]     Acute respiratory failure with hypoxia (HCC) [J96.01]     Multifocal pneumonia [J18.9]     BMI 35.0-35.9,adult [Z68.35]     NOREEN (acute kidney injury) ([facility] [address] [N17.9]     Lymphopenia [D72.810]     LFT elevation [R79.89]     Dyspnea [R06.00] 10/31/2020       The patient was seen and examined on day of discharge and this discharge summary is in conjunction with any daily progress note from day of discharge. Condition at discharge - stable    Hospital Course: patient seen and evaluated on the day of discharge. Patient informed about following up with appointments. Patient verbalized understanding for follow-up appointments. All questions of the patient answered, patient is in agreement with the discharge plan. Medical reconciliation performed. Patient will be discharged stable condition.  Patient is a 66-year-old male with past medical history of COPD, diabetes mellitus, atrial fibrillation, who presents to the hospital for difficulty breathing.  According to the patient he is feeling fatigued, does not have appetite for the past 10 days. [facility] mentions this started after he got flu shot on 19/10.  Patient also mentions feeling subjective fevers, he has cough-nonproductive.  He also endorses shortness of breath and feeling congested.  Denies chest pain nausea vomiting diarrhea constipation.     Assessment  Generalized weakness, fatigue secondary to COVID-19 infection  Acute hypoxic respiratory failure episode in ED satting less than 90% on room air  Atrial fibrillation with RVR on Coumadin  Hyperglycemia secondary to uncontrolled diabetes  Elevated creatinine likely secondary CKD stage III-kidney function has improved  Multifocal pneumonia secondary to COVID-19 pneumonia  Diabetes mellitus  Hypertension  COPD     Plan  Continue dexamethasone, oxygen supplementation, holding remdesivir given increasing creatinine, ID following, DuoNeb, on coumadin, continue to monitor pulse oximetry  S/p IV Cardizem bolus in ED, IV Cardizem gtt, resume home Coumadin, s/p digoxin loading dose per cardiology, noted cardiology recommendations  Initiate insulin sliding scale, add 10 units Lantus nightly, 4 unitis lispro TID with meals  DVT Prophylaxis: on coumadin  Diet: DIET CARDIAC; Low Sodium (2 GM); Daily Fluid Restriction: 2000 ml  Code Status: Full Code    Exam:     BP (!) 108/58   Pulse 87   Temp 97.9 °F (36.6 °C) (Oral)   Resp 16   Ht 5' 10\" (1.778 m)   Wt 249 lb 9 oz (113.2 kg)   SpO2 94%   BMI 35.81 kg/m²     General appearance: No apparent distress  HEENT:  Conjunctivae/corneas clear. Neck: Supple, No jugular venous distention. Respiratory:  Normal respiratory effort. Clear to auscultation, bilaterally without Rales/Wheezes/Rhonchi. Cardiovascular: Regular rate and rhythm with normal S1/S2 without murmurs, rubs or gallops. Abdomen: Soft, non-tender, non-distended, normal bowel sounds. Musculoskelatal: No clubbing, cyanosis or edema bilaterally. Skin: Skin color, texture, turgor normal.   Neurologic: no focal neurologic deficits. grossly non-focal.  Psychiatric: Alert and oriented, normal mood    Consults:     IP CONSULT TO PULMONOLOGY  IP CONSULT TO PHARMACY  IP CONSULT TO NEPHROLOGY  IP CONSULT TO CARDIOLOGY  IP CONSULT TO INFECTIOUS DISEASES  IP CONSULT TO PHARMACY  IP CONSULT TO PHARMACY  IP CONSULT TO PHARMACY      Code Status:  Prior    Activity: activity as tolerated    Labs:  For convenience and continuity at follow-up the following most recent labs are provided:      CBC:    Lab Results   Component Value Date    WBC 6.8 11/03/2020    HGB 12.7 11/03/2020    HCT 38.3 11/03/2020     11/03/2020       Renal:    Lab Results   Component Value Date     11/03/2020    K 3.8 11/03/2020    K 4.3 11/02/2020     11/03/2020    CO2 26 11/03/2020    BUN 50 11/03/2020    CREATININE 1.2 11/03/2020    CALCIUM 8.5 11/03/2020       Discharge Medications:     Discharge Medication List as of 11/3/2020 10:51 AM           Details   dexamethasone (DECADRON) 0.5 MG tablet Take 12 tablets by mouth daily (with breakfast) for 7 days, Disp-84 tablet,R-0Normal              Details   empagliflozin (JARDIANCE) 25 MG tablet Take 25 mg by mouth daily Historical Med      insulin lispro (HUMALOG) 100 UNIT/ML injection vial Inject 0-3 Units into the skin 3 times daily (before meals)Historical Med      docusate sodium (COLACE) 100 MG capsule Take 100 mg by mouth 2 times dailyHistorical Med      albuterol (PROVENTIL) (2.5 MG/3ML) 0.083% nebulizer solution Take 2.5 mg by nebulization every 6 hours as needed for WheezingHistorical Med      metoprolol succinate (TOPROL XL) 100 MG extended release tablet Take 150 mg by mouth daily Historical Med      warfarin (COUMADIN) 5 MG tablet Take 7.5 mg by mouth every evening Historical Med      Probiotic Product (PROBIOTIC-10 PO) Take 1 tablet by mouth dailyHistorical Med      torsemide (DEMADEX) 20 MG tablet Take 20 mg by mouth 2 times daily Historical Med      potassium chloride (KLOR-CON M) 20 MEQ TBCR extended release tablet Take 20 mEq by mouth 2 times daily Historical Med      folic acid (FOLVITE) 1 MG tablet Take 1 mg by mouth dailyHistorical Med      mometasone-formoterol (DULERA) 200-5 MCG/ACT inhaler Inhale 2 puffs into the lungs every 12 hoursHistorical Med      ferrous sulfate 325 (65 FE) MG tablet Take 325 mg by mouth daily (with breakfast) Historical Med      atorvastatin (LIPITOR) 20 MG tablet Take 20 mg by mouth nightly Historical Med      [facility] LANCETS 33G MISC by Does not apply routeHistorical Med      tamsulosin (FLOMAX) 0.4 MG capsule Take 0.4 mg by mouth nightly Historical Med             Time Spent on discharge is more than 30 mints in the examination, evaluation, counseling and review of medications and discharge plan. Signed:    Chinyere [place] MD   11/14/2020      Thank you [address] [place] [address] for the opportunity to be involved in this patient's care. If you have any questions or concerns please feel free to contact me at [phone].

## 2020-12-04 ENCOUNTER — OFFICE VISIT (OUTPATIENT)
Dept: PULMONOLOGY | Age: 85
End: 2020-12-04
Payer: MEDICARE

## 2020-12-04 VITALS
WEIGHT: 256.8 LBS | DIASTOLIC BLOOD PRESSURE: 70 MMHG | RESPIRATION RATE: 12 BRPM | SYSTOLIC BLOOD PRESSURE: 126 MMHG | BODY MASS INDEX: 36.76 KG/M2 | HEIGHT: 70 IN | TEMPERATURE: 98.8 F | HEART RATE: 120 BPM | OXYGEN SATURATION: 96 %

## 2020-12-04 PROCEDURE — G8417 CALC BMI ABV UP PARAM F/U: HCPCS | Performed by: INTERNAL MEDICINE

## 2020-12-04 PROCEDURE — 4040F PNEUMOC VAC/ADMIN/RCVD: CPT | Performed by: INTERNAL MEDICINE

## 2020-12-04 PROCEDURE — G8428 CUR MEDS NOT DOCUMENT: HCPCS | Performed by: INTERNAL MEDICINE

## 2020-12-04 PROCEDURE — 1036F TOBACCO NON-USER: CPT | Performed by: INTERNAL MEDICINE

## 2020-12-04 PROCEDURE — 99212 OFFICE O/P EST SF 10 MIN: CPT | Performed by: INTERNAL MEDICINE

## 2020-12-04 PROCEDURE — G8484 FLU IMMUNIZE NO ADMIN: HCPCS | Performed by: INTERNAL MEDICINE

## 2020-12-04 PROCEDURE — 1123F ACP DISCUSS/DSCN MKR DOCD: CPT | Performed by: INTERNAL MEDICINE

## 2020-12-04 RX ORDER — INSULIN GLARGINE 100 [IU]/ML
INJECTION, SOLUTION SUBCUTANEOUS NIGHTLY
COMMUNITY

## 2020-12-04 NOTE — ASSESSMENT & PLAN NOTE
-Admitted this fall with hypoxia, complicated by A. fib with RVR.  -He was successfully weaned off of oxygen, he completed Decadron and remdesivir treatments.  -He does not appear to have any lasting pulmonary Covid complications from a symptom standpoint.  -No further follow-up for surveillance required, if he develops progressive shortness of breath may need repeat CT scan to evaluate for possible fibrotic changes otherwise considered resolved.

## 2020-12-04 NOTE — PROGRESS NOTES
REASON FOR CONSULTATION:  Chief Complaint   Patient presents with    Follow-Up from Hospital     pneumonia due to [address] at request of [address] [place] [address]     PCP: [address] [place] [address]    HISTORY OF PRESENT ILLNESS: Marilyn Valladares is a 80y.o. year old male with a history of A. fib/flutter, CAD DAWSON who presents as posthospitalization follow-up where he was admitted for COVID-19 pneumonia. While he was admitted he was on oxygen with chest infiltrates on radiograph but his oxygen requirement improved. He was in the hospital for A. fib with RVR requiring diltiazem infusion. Patient states since discharge he had a week of lingering productive cough and chest congestion. He states over the past week or 2 the symptoms have improved and he is feeling back to baseline. Past Medical History:   Diagnosis Date    [facility] ([facility]     Cancer ([facility]     CHF (congestive heart failure) (HCC)     COPD (chronic obstructive pulmonary disease) (HCC)     Diabetes (HCC)     Melanoma of skin, site unspecified     Malignant melanoma    Pain in back     Pulmonary emboli [facility])        Past Surgical History:   Procedure Laterality Date    COLONOSCOPY      COLONOSCOPY  03/07/2018    PACEMAKER INSERTION      PACEMAKER PLACEMENT      removal    SKIN CANCER EXCISION Right     TOE SURGERY Right        family history is not on file. Family history reviewed with patient, pertinent positive HPI    SOCIAL HISTORY:   reports that he has quit smoking. He has never used smokeless tobacco.      ALLERGIES:  Patient is allergic to no known allergies.     REVIEW OF SYSTEMS:  Constitutional: Negative for fever   HENT: Negative for sore throat  Eyes: Negative for redness   Respiratory: Negative for dyspnea, cough  Cardiovascular: Negative for chest pain  Gastrointestinal: Negative for vomiting, diarrhea   Genitourinary: Negative for hematuria   Musculoskeletal: Negative for arthralgias   Skin: Negative for rash  Neurological: Negative for syncope  Hematological: Negative for adenopathy  Psychiatric/Behavorial: Negative for anxiety    Objective:   PHYSICAL EXAM:  Blood pressure 126/70, pulse 120, temperature 98.8 °F (37.1 °C), temperature source Oral, resp. rate 12, height 5' 10\" (1.778 m), weight 256 lb 12.8 oz (116.5 kg), SpO2 96 %.'  Gen: No distress. Eyes: PERRL. No sclera icterus. No conjunctival injection. ENT: No discharge. Pharynx clear. External appearance of ears and nose normal.  Neck: Trachea midline. No obvious mass. Resp: No accessory muscle use. No crackles. No wheezes. No rhonchi. CV: Regular rate. Irregular rhythm. No murmur or rub. No edema. GI: Non-tender. Non-distended. No hernia. Skin: Warm, dry, normal texture and turgor. No nodule on exposed extremities. Lymph: No cervical LAD. No supraclavicular LAD. M/S: No cyanosis. No clubbing. No joint deformity. Neuro: Moves all four extremities. Psych: Oriented x 3. No anxiety. Awake. Alert. Intact judgement and insight.     Current Outpatient Medications   Medication Sig Dispense Refill    SITagliptin (JANUVIA) 100 MG tablet Take 100 mg by mouth daily      insulin glargine (LANTUS) 100 UNIT/ML injection vial Inject into the skin nightly      empagliflozin (JARDIANCE) 25 MG tablet Take 25 mg by mouth daily       insulin lispro (HUMALOG) 100 UNIT/ML injection vial Inject 0-3 Units into the skin 3 times daily (before meals)      docusate sodium (COLACE) 100 MG capsule Take 100 mg by mouth 2 times daily      albuterol (PROVENTIL) (2.5 MG/3ML) 0.083% nebulizer solution Take 2.5 mg by nebulization every 6 hours as needed for Wheezing      metoprolol succinate (TOPROL XL) 100 MG extended release tablet Take 150 mg by mouth daily       warfarin (COUMADIN) 5 MG tablet Take 7.5 mg by mouth every evening       Probiotic Product (PROBIOTIC-10 PO) Take 1 tablet by mouth daily      torsemide (DEMADEX) 20 MG tablet Take 20 mg by mouth 2 times daily       potassium chloride (KLOR-CON M) 20 MEQ TBCR extended release tablet Take 20 mEq by mouth 2 times daily       folic acid (FOLVITE) 1 MG tablet Take 1 mg by mouth daily      mometasone-formoterol (DULERA) 200-5 MCG/ACT inhaler Inhale 2 puffs into the lungs every 12 hours      ferrous sulfate 325 (65 FE) MG tablet Take 325 mg by mouth daily (with breakfast)       atorvastatin (LIPITOR) 20 MG tablet Take 20 mg by mouth nightly       ONETOUCH DELICA LANCETS 00A MISC by Does not apply route      tamsulosin (FLOMAX) 0.4 MG capsule Take 0.4 mg by mouth nightly        No current facility-administered medications for this visit. Data Reviewed:   CBC and Renal reviewed  Last CBC  Lab Results   Component Value Date    WBC 6.8 11/03/2020    RBC 4.64 11/03/2020    HGB 12.7 11/03/2020    MCV 82.5 11/03/2020     11/03/2020     Last Renal  Lab Results   Component Value Date     11/03/2020    K 3.8 11/03/2020    K 4.3 11/02/2020     11/03/2020    CO2 26 11/03/2020    CO2 28 11/02/2020    CO2 26 11/01/2020    BUN 50 11/03/2020    CREATININE 1.2 11/03/2020    GLUCOSE 220 11/03/2020    CALCIUM 8.5 11/03/2020       Last ABG  POC Blood Gas: No results found for: POCPH, POCPCO2, POCPO2, POCHCO3, NBEA, WAWG8SCW  No results for input(s): PH, PCO2, PO2, HCO3, BE, O2SAT in the last 72 hours. Radiology Review:  Pertinent images / reports were reviewed as a part of this visit. CT Chest w/ contrast:   Results for orders placed during the hospital encounter of 11/19/12   CT chest with contrast    Narrative    CT CHEST WITH CONTRAST(PE PROTOCOL)     INDICATION-   CP      TECHNIQUE- Multiple axial CT images of the chest were obtained  following the administration of IV contrast. Coronal reformatted  images were obtained. COMPARISON- None     FINDINGS-  There is no thoracic adenopathy. Small right pleural  effusion and a small pericardial effusion are present.  Small  pulmonary emboli are present in a secondary branch of the right  upper lobe and multiple secondary and tertiary branches in the  left lower lobe. Calcified granulomata are noted. The lungs are  otherwise clear. Cardiomegaly is present. The visualized upper  abdomen is unremarkable. Bone windows reveal no acute osseous  abnormality. IMPRESSION-      1. Pulmonary emboli in the secondary and tertiary arterial  branches  2. Cardiomegaly  3. Small right pleural effusion  4. Small pericardial effusion        The findings were discussed with Dr. Oracio Baires of the emergency  department at 8-00 p.m. on 11/19/2012. Dictated on- 11/19/2012 [id number]          Electronically Read By- Brandy Basurto M.D. Electronically Released By- Brandy Basurto M.D. Released Date Time- 11/19/12 2002          Ash Barrera M.D.   ------------------------------------------------------------------------------       CT Chest w/o contrast:   Results for orders placed during the hospital encounter of 10/31/20   CT CHEST WO CONTRAST    Narrative EXAMINATION:  CT OF THE CHEST WITHOUT CONTRAST 10/31/2020 3:38 pm    TECHNIQUE:  CT of the chest was performed without the administration of intravenous  contrast. Multiplanar reformatted images are provided for review. Dose  modulation, iterative reconstruction, and/or weight based adjustment of the  mA/kV was utilized to reduce the radiation dose to as low as reasonably  achievable. COMPARISON:  None. HISTORY:  ORDERING SYSTEM PROVIDED HISTORY: Pulm vasc congestion? CHF? Pneumonia  TECHNOLOGIST PROVIDED HISTORY:  Reason for exam:->Pulm vasc congestion? CHF? Pneumonia  Reason for Exam: Fatigue (COVID +. dx with pneumonia last week at urgent  care. c/o feeling \"more tired\", decreased oral intake, and occ shortness of  breath. to ED for eval  Acuity: Acute  Type of Exam: Initial    FINDINGS:  Mediastinum: Thyroid gland appears normal.  Atherosclerotic change seen in  aorta.   Trace pericardial fluid is seen. Coronary artery calcification is  seen. Aortic valve calcification is seen. Small hiatal hernia seen. There  is nonspecific thickening at the GE junction    Lungs/pleura: On the left, scattered ground-glass opacities are seen in the  left upper and left lower lobe. There is mild bronchiectasis, greatest  inferiorly. No significant septal thickening on the left. No significant  pleural fluid on the left. On the right scattered peripheral ground-glass opacity seen in the right  upper lobe, right middle lobe and right lower lobe. There is mild basilar  bronchiectasis. Trace pleural fluid is seen on the right. Upper Abdomen: Right adrenal gland is normal.  Left adrenal gland is normal    Spleen is mildly enlarged. Mild stool load seen in the colon. Soft Tissues/Bones: Spurring is seen in the spine. Impression Scattered ground-glass opacities are seen throughout the lungs bilaterally,  right greater than left, predominantly peripheral in the right upper lobe. Findings are likely due to the reported history of COVID-19 pneumonia. Tiny right-sided pleural effusion         CTPA: No results found for this or any previous visit. CXR PA/LAT:   Results for orders placed during the hospital encounter of 12/14/19   XR CHEST STANDARD (2 VW)    Narrative EXAMINATION:  TWO XRAY VIEWS OF THE CHEST    12/14/2019 4:25 pm    COMPARISON:  Frontal and lateral views of the chest 01/24/2018, 06/04/2017. HISTORY:  ORDERING SYSTEM PROVIDED HISTORY: Shortness of breath  TECHNOLOGIST PROVIDED HISTORY:  Reason for exam:->Shortness of breath  Reason for Exam: Shortness of Breath (heaviness in chest. SOBE starting 2  days ago. denies cp currently. productive cough )  Acuity: Acute  Type of Exam: Initial  Relevant Medical/Surgical History: diabetic    FINDINGS:  Interval development of a small right-sided pleural effusion with likely  adjacent atelectasis.   A superimposed infectious/inflammatory process can not  be entirely excluded. The cardiopericardial silhouette is again noted to be enlarged but stable. Atherosclerotic calcification of the thoracic aorta is again noted. There is no pneumothorax. Multilevel degenerative changes are present  throughout the thoracic spine. Impression 1. Interval development of a small right-sided pleural effusion with likely  adjacent atelectasis. A superimposed infectious/inflammatory process can not  be entirely excluded. 2.  Stable cardiomegaly. CXR portable:   Results for orders placed during the hospital encounter of 10/31/20   XR CHEST PORTABLE    Narrative EXAMINATION:  ONE XRAY VIEW OF THE CHEST    10/31/2020 12:36 pm    COMPARISON:  12/14/2019    HISTORY:  ORDERING SYSTEM PROVIDED HISTORY: sob, covid  TECHNOLOGIST PROVIDED HISTORY:  Reason for exam:->sob, covid  Reason for Exam: sob, covid  Acuity: Acute  Type of Exam: Initial    FINDINGS:  The heart is moderately enlarged. The pulmonary vasculature is slightly  prominent. No definite acute airspace disease identified. No adenopathy or  pleural effusion. Impression Moderate cardiomegaly. Slight pulmonary vascular congestion. No definite  acute airspace disease. RECOMMENDATION:  If symptoms disproportionate to radiographic findings, CT chest would be  recommended to see if early pneumonia present and obscured by vascular  congestion. Assessment/Plan:       Diagnosis Orders   1.  Pneumonia due to COVID-19 virus           Problem List Items Addressed This Visit        Pulmonary Problems    Pneumonia due to COVID-19 virus - Primary     -Admitted this fall with hypoxia, complicated by A. fib with RVR.  -He was successfully weaned off of oxygen, he completed Decadron and remdesivir treatments.  -He does not appear to have any lasting pulmonary Covid complications from a symptom standpoint.  -No further follow-up for surveillance required, if he develops progressive shortness of breath may need repeat CT scan to evaluate for possible fibrotic changes otherwise considered resolved. Can follow-up as needed       This note was transcribed using [id number]. Please disregard any translational errors.     Mickie Chin [address] [place] [address] Pulmonary, Sleep and Critical Care

## 2023-03-25 ENCOUNTER — APPOINTMENT (OUTPATIENT)
Dept: GENERAL RADIOLOGY | Age: 88
End: 2023-03-25
Payer: MEDICARE

## 2023-03-25 ENCOUNTER — APPOINTMENT (OUTPATIENT)
Dept: CT IMAGING | Age: 88
End: 2023-03-25
Payer: MEDICARE

## 2023-03-25 ENCOUNTER — HOSPITAL ENCOUNTER (EMERGENCY)
Age: 88
Discharge: HOME | End: 2023-03-25
Payer: MEDICARE

## 2023-03-25 VITALS
RESPIRATION RATE: 17 BRPM | OXYGEN SATURATION: 100 % | BODY MASS INDEX: 35.6 KG/M2 | DIASTOLIC BLOOD PRESSURE: 66 MMHG | TEMPERATURE: 96.8 F | HEART RATE: 77 BPM | HEIGHT: 70 IN | WEIGHT: 248.68 LBS | SYSTOLIC BLOOD PRESSURE: 113 MMHG
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
## 2023-03-25 DIAGNOSIS — S09.90XA CLOSED HEAD INJURY, INITIAL ENCOUNTER: Primary | ICD-10-CM

## 2023-03-25 DIAGNOSIS — Z79.01 ANTICOAGULATED ON WARFARIN: ICD-10-CM

## 2023-03-25 DIAGNOSIS — S29.019A THORACIC MYOFASCIAL STRAIN, INITIAL ENCOUNTER: ICD-10-CM

## 2023-03-25 LAB
ABO + RH BLD: NORMAL
ALBUMIN SERPL-MCNC: 4.4 G/DL (ref 3.4–5)
ALBUMIN/GLOB SERPL: 1.7 {RATIO} (ref 1.1–2.2)
ALP SERPL-CCNC: 86 U/L (ref 40–129)
ALT SERPL-CCNC: 26 U/L (ref 10–40)
ANION GAP SERPL CALCULATED.3IONS-SCNC: 14 MMOL/L (ref 3–16)
AST SERPL-CCNC: 25 U/L (ref 15–37)
BASOPHILS # BLD: 0 K/UL (ref 0–0.2)
BASOPHILS NFR BLD: 0.6 %
BILIRUB SERPL-MCNC: 0.7 MG/DL (ref 0–1)
BLD GP AB SCN SERPL QL: NORMAL
BUN SERPL-MCNC: 43 MG/DL (ref 7–20)
CALCIUM SERPL-MCNC: 9 MG/DL (ref 8.3–10.6)
CHLORIDE SERPL-SCNC: 97 MMOL/L (ref 99–110)
CO2 SERPL-SCNC: 26 MMOL/L (ref 21–32)
CREAT SERPL-MCNC: 1.3 MG/DL (ref 0.8–1.3)
DEPRECATED RDW RBC AUTO: 17.3 % (ref 12.4–15.4)
EOSINOPHIL # BLD: 0.1 K/UL (ref 0–0.6)
EOSINOPHIL NFR BLD: 0.8 %
GFR SERPLBLD CREATININE-BSD FMLA CKD-EPI: 53 ML/MIN/{1.73_M2}
GLUCOSE SERPL-MCNC: 352 MG/DL (ref 70–99)
HCT VFR BLD AUTO: 44.3 % (ref 40.5–52.5)
HGB BLD-MCNC: 14.7 G/DL (ref 13.5–17.5)
INR PPP: 2.01 (ref 0.87–1.14)
LYMPHOCYTES # BLD: 1 K/UL (ref 1–5.1)
LYMPHOCYTES NFR BLD: 15.5 %
MCH RBC QN AUTO: 27.8 PG (ref 26–34)
MCHC RBC AUTO-ENTMCNC: 33 G/DL (ref 31–36)
MCV RBC AUTO: 84.1 FL (ref 80–100)
MONOCYTES # BLD: 0.7 K/UL (ref 0–1.3)
MONOCYTES NFR BLD: 10 %
NEUTROPHILS # BLD: 4.9 K/UL (ref 1.7–7.7)
NEUTROPHILS NFR BLD: 73.1 %
PLATELET # BLD AUTO: 110 K/UL (ref 135–450)
PMV BLD AUTO: 9.9 FL (ref 5–10.5)
POTASSIUM SERPL-SCNC: 3.6 MMOL/L (ref 3.5–5.1)
PROT SERPL-MCNC: 7 G/DL (ref 6.4–8.2)
PROTHROMBIN TIME: 22.8 SEC (ref 11.7–14.5)
RBC # BLD AUTO: 5.27 M/UL (ref 4.2–5.9)
SODIUM SERPL-SCNC: 137 MMOL/L (ref 136–145)
WBC # BLD AUTO: 6.7 K/UL (ref 4–11)

## 2023-03-25 PROCEDURE — 36415 COLL VENOUS BLD VENIPUNCTURE: CPT

## 2023-03-25 PROCEDURE — 99284 EMERGENCY DEPT VISIT MOD MDM: CPT

## 2023-03-25 PROCEDURE — 85610 PROTHROMBIN TIME: CPT

## 2023-03-25 PROCEDURE — 86901 BLOOD TYPING SEROLOGIC RH(D): CPT

## 2023-03-25 PROCEDURE — 6370000000 HC RX 637 (ALT 250 FOR IP): Performed by: PHYSICIAN ASSISTANT

## 2023-03-25 PROCEDURE — 72125 CT NECK SPINE W/O DYE: CPT

## 2023-03-25 PROCEDURE — 80053 COMPREHEN METABOLIC PANEL: CPT

## 2023-03-25 PROCEDURE — 85025 COMPLETE CBC W/AUTO DIFF WBC: CPT

## 2023-03-25 PROCEDURE — 73030 X-RAY EXAM OF SHOULDER: CPT

## 2023-03-25 PROCEDURE — 86900 BLOOD TYPING SEROLOGIC ABO: CPT

## 2023-03-25 PROCEDURE — 86850 RBC ANTIBODY SCREEN: CPT

## 2023-03-25 PROCEDURE — 70450 CT HEAD/BRAIN W/O DYE: CPT

## 2023-03-25 RX ORDER — LIDOCAINE 4 G/G
1 PATCH TOPICAL DAILY
Status: DISCONTINUED | OUTPATIENT
Start: 2023-03-25 | End: 2023-03-25 | Stop reason: HOSPADM

## 2023-03-25 RX ORDER — SENNOSIDES 8.6 MG
650 CAPSULE ORAL EVERY 8 HOURS PRN
Qty: 60 TABLET | Refills: 0 | Status: SHIPPED | OUTPATIENT
Start: 2023-03-25

## 2023-03-25 RX ORDER — METHOCARBAMOL 500 MG/1
500 TABLET, FILM COATED ORAL EVERY 8 HOURS PRN
Qty: 12 TABLET | Refills: 0 | Status: SHIPPED | OUTPATIENT
Start: 2023-03-25 | End: 2023-03-29

## 2023-03-25 RX ORDER — METHOCARBAMOL 500 MG/1
500 TABLET, FILM COATED ORAL ONCE
Status: COMPLETED | OUTPATIENT
Start: 2023-03-25 | End: 2023-03-25

## 2023-03-25 RX ORDER — ACETAMINOPHEN 500 MG
1000 TABLET ORAL ONCE
Status: COMPLETED | OUTPATIENT
Start: 2023-03-25 | End: 2023-03-25

## 2023-03-25 RX ADMIN — ACETAMINOPHEN 1000 MG: 500 TABLET ORAL at 11:54

## 2023-03-25 RX ADMIN — METHOCARBAMOL 500 MG: 500 TABLET ORAL at 11:55

## 2023-03-25 ASSESSMENT — PAIN - FUNCTIONAL ASSESSMENT
PAIN_FUNCTIONAL_ASSESSMENT: NONE - DENIES PAIN
PAIN_FUNCTIONAL_ASSESSMENT: 0-10

## 2023-03-25 ASSESSMENT — PAIN DESCRIPTION - ORIENTATION
ORIENTATION: RIGHT
ORIENTATION: RIGHT

## 2023-03-25 ASSESSMENT — PAIN DESCRIPTION - LOCATION
LOCATION: SHOULDER;BACK
LOCATION: SHOULDER

## 2023-03-25 ASSESSMENT — PAIN SCALES - GENERAL: PAINLEVEL_OUTOF10: 10

## 2023-03-25 ASSESSMENT — PAIN DESCRIPTION - DESCRIPTORS: DESCRIPTORS: ACHING

## 2023-03-25 NOTE — ED PROVIDER NOTES
cervical spine. CT HEAD WO CONTRAST   Final Result   No acute intracranial abnormality. Diffuse atrophic changes with findings suggesting chronic microvascular   ischemia           XR SHOULDER RIGHT (MIN 2 VIEWS)    Result Date: 3/25/2023  EXAMINATION: THREE XRAY VIEWS OF THE RIGHT SHOULDER 3/25/2023 11:30 am COMPARISON: None. HISTORY: ORDERING SYSTEM PROVIDED HISTORY: fall, hit head, shoulder and back TECHNOLOGIST PROVIDED HISTORY: Reason for exam:->fall, hit head, shoulder and back Reason for Exam: fall, hit head, shoulder and back FINDINGS: Three views were obtained of the right shoulder. On the transscapular Y-view, the humeral head overlies the glenoid. No gross fracture. Spurring is seen of the acromioclavicular joint, without widening. Cortical irregularity is seen along the lateral surface of the right mid humeral diaphysis, approximately 5 cm in length. No gross fracture or dislocation. Cortical irregularity along the mid right humeral diaphysis, potentially due to prior trauma, infection, or tug lesion. Malignancy cannot be excluded if patient has an underlying history. Clinical correlation and follow-up suggested. CT HEAD WO CONTRAST    Result Date: 3/25/2023  EXAMINATION: CT OF THE HEAD WITHOUT CONTRAST  3/25/2023 11:21 am TECHNIQUE: CT of the head was performed without the administration of intravenous contrast. Automated exposure control, iterative reconstruction, and/or weight based adjustment of the mA/kV was utilized to reduce the radiation dose to as low as reasonably achievable. COMPARISON: None. HISTORY: ORDERING SYSTEM PROVIDED HISTORY: fall hit head on blood thinner TECHNOLOGIST PROVIDED HISTORY: Reason for exam:->fall hit head on blood thinner Has a \"code stroke\" or \"stroke alert\" been called? ->No Decision Support Exception - unselect if not a suspected or confirmed emergency medical condition->Emergency Medical Condition (MA) FINDINGS: BRAIN/VENTRICLES: The ventricles and sulci are diffusely enlarged. Low attenuation is seen in the periventricular and subcortical white matter. No acute intracranial hemorrhage or acute infarct is identified. ORBITS: The visualized portion of the orbits demonstrate no acute abnormality. SINUSES: The visualized paranasal sinuses and mastoid air cells demonstrate no acute abnormality. SOFT TISSUES/SKULL:  No acute abnormality of the visualized skull or soft tissues. No acute intracranial abnormality. Diffuse atrophic changes with findings suggesting chronic microvascular ischemia     CT CERVICAL SPINE WO CONTRAST    Result Date: 3/25/2023  EXAMINATION: CT OF THE CERVICAL SPINE WITHOUT CONTRAST 3/25/2023 11:20 am TECHNIQUE: CT of the cervical spine was performed without the administration of intravenous contrast. Multiplanar reformatted images are provided for review. Automated exposure control, iterative reconstruction, and/or weight based adjustment of the mA/kV was utilized to reduce the radiation dose to as low as reasonably achievable. COMPARISON: None. HISTORY: ORDERING SYSTEM PROVIDED HISTORY: fall hit head on blood thinner TECHNOLOGIST PROVIDED HISTORY: Reason for exam:->fall hit head on blood thinner Decision Support Exception - unselect if not a suspected or confirmed emergency medical condition->Emergency Medical Condition (MA) FINDINGS: BONES/ALIGNMENT: There is no acute fracture or traumatic malalignment. DEGENERATIVE CHANGES: There are osteoarthritic changes and fusion in the right and left facet joints. SOFT TISSUES: There is no prevertebral soft tissue swelling. No acute abnormality of the cervical spine. No results found.     PROCEDURES   Unless otherwise noted below, none     Procedures    CRITICAL CARE TIME (.cctime)   none    PAST MEDICAL HISTORY      has a past medical history of Atrial fibrillation ([address] Cancer ([address] CHF (congestive heart failure) ([address] COPD (chronic obstructive pulmonary disease)

## 2023-03-25 NOTE — DISCHARGE INSTRUCTIONS
Home in stable condition to ambulate with care, using cane or walker for stability if needed. Use medications as written, may ice or heat the area for comfort, do moderate rest, and gradually advance activity as tolerated. Follow-up outpatient with your family doctor in 2 to 3 days for recheck and further care including concerning the right humerus abnormal appearance. Return to the ER for any emergency worsening or concern. XR SHOULDER RIGHT (MIN 2 VIEWS)   Final Result   No gross fracture or dislocation. Cortical irregularity along the mid right humeral diaphysis, potentially due   to prior trauma, infection, or tug lesion. Malignancy cannot be excluded if   patient has an underlying history. Clinical correlation and follow-up   suggested. CT CERVICAL SPINE WO CONTRAST   Final Result   No acute abnormality of the cervical spine. CT HEAD WO CONTRAST   Final Result   No acute intracranial abnormality.       Diffuse atrophic changes with findings suggesting chronic microvascular   ischemia

## 2023-11-03 ENCOUNTER — HOSPITAL ENCOUNTER (INPATIENT)
Age: 88
LOS: 1 days | Discharge: HOME | DRG: 682 | End: 2023-11-05
Attending: STUDENT IN AN ORGANIZED HEALTH CARE EDUCATION/TRAINING PROGRAM | Admitting: STUDENT IN AN ORGANIZED HEALTH CARE EDUCATION/TRAINING PROGRAM
Payer: MEDICARE

## 2023-11-03 ENCOUNTER — APPOINTMENT (OUTPATIENT)
Dept: GENERAL RADIOLOGY | Age: 88
DRG: 682 | End: 2023-11-03
Payer: MEDICARE

## 2023-11-03 DIAGNOSIS — J90 PLEURAL EFFUSION: ICD-10-CM

## 2023-11-03 DIAGNOSIS — R79.89 ELEVATED TROPONIN: ICD-10-CM

## 2023-11-03 DIAGNOSIS — U07.1 COVID-19: Primary | ICD-10-CM

## 2023-11-03 DIAGNOSIS — R06.02 SHORTNESS OF BREATH: ICD-10-CM

## 2023-11-03 LAB
ALBUMIN SERPL-MCNC: 4 G/DL (ref 3.4–5)
ALBUMIN/GLOB SERPL: 1.4 {RATIO} (ref 1.1–2.2)
ALP SERPL-CCNC: 91 U/L (ref 40–129)
ALT SERPL-CCNC: 30 U/L (ref 10–40)
ANION GAP SERPL CALCULATED.3IONS-SCNC: 12 MMOL/L (ref 3–16)
AST SERPL-CCNC: 18 U/L (ref 15–37)
BASOPHILS # BLD: 0.1 K/UL (ref 0–0.2)
BASOPHILS NFR BLD: 0.6 %
BILIRUB SERPL-MCNC: 0.8 MG/DL (ref 0–1)
BUN SERPL-MCNC: 46 MG/DL (ref 7–20)
CALCIUM SERPL-MCNC: 8.7 MG/DL (ref 8.3–10.6)
CHLORIDE SERPL-SCNC: 96 MMOL/L (ref 99–110)
CO2 SERPL-SCNC: 28 MMOL/L (ref 21–32)
CREAT SERPL-MCNC: 1.8 MG/DL (ref 0.8–1.3)
DEPRECATED RDW RBC AUTO: 17.7 % (ref 12.4–15.4)
EOSINOPHIL # BLD: 0 K/UL (ref 0–0.6)
EOSINOPHIL NFR BLD: 0.3 %
FLUAV RNA UPPER RESP QL NAA+PROBE: NEGATIVE
FLUBV AG NPH QL: NEGATIVE
GFR SERPLBLD CREATININE-BSD FMLA CKD-EPI: 35 ML/MIN/{1.73_M2}
GLUCOSE SERPL-MCNC: 246 MG/DL (ref 70–99)
HCT VFR BLD AUTO: 43.6 % (ref 40.5–52.5)
HGB BLD-MCNC: 14.8 G/DL (ref 13.5–17.5)
LACTATE BLDV-SCNC: 1.8 MMOL/L (ref 0.4–2)
LYMPHOCYTES # BLD: 0.7 K/UL (ref 1–5.1)
LYMPHOCYTES NFR BLD: 6.4 %
MCH RBC QN AUTO: 27.7 PG (ref 26–34)
MCHC RBC AUTO-ENTMCNC: 33.9 G/DL (ref 31–36)
MCV RBC AUTO: 81.8 FL (ref 80–100)
MONOCYTES # BLD: 1.2 K/UL (ref 0–1.3)
MONOCYTES NFR BLD: 10.7 %
NEUTROPHILS # BLD: 8.9 K/UL (ref 1.7–7.7)
NEUTROPHILS NFR BLD: 82 %
NT-PROBNP SERPL-MCNC: 840 PG/ML (ref 0–449)
PLATELET # BLD AUTO: 142 K/UL (ref 135–450)
PMV BLD AUTO: 8.7 FL (ref 5–10.5)
POTASSIUM SERPL-SCNC: 4.3 MMOL/L (ref 3.5–5.1)
PROT SERPL-MCNC: 6.8 G/DL (ref 6.4–8.2)
RBC # BLD AUTO: 5.33 M/UL (ref 4.2–5.9)
SARS-COV-2 RDRP RESP QL NAA+PROBE: DETECTED
SODIUM SERPL-SCNC: 136 MMOL/L (ref 136–145)
TROPONIN, HIGH SENSITIVITY: 30 NG/L (ref 0–22)
TROPONIN, HIGH SENSITIVITY: 34 NG/L (ref 0–22)
WBC # BLD AUTO: 10.9 K/UL (ref 4–11)

## 2023-11-03 PROCEDURE — 87635 SARS-COV-2 COVID-19 AMP PRB: CPT

## 2023-11-03 PROCEDURE — 83605 ASSAY OF LACTIC ACID: CPT

## 2023-11-03 PROCEDURE — 71046 X-RAY EXAM CHEST 2 VIEWS: CPT

## 2023-11-03 PROCEDURE — 6370000000 HC RX 637 (ALT 250 FOR IP): Performed by: PHYSICIAN ASSISTANT

## 2023-11-03 PROCEDURE — 84484 ASSAY OF TROPONIN QUANT: CPT

## 2023-11-03 PROCEDURE — 93005 ELECTROCARDIOGRAM TRACING: CPT | Performed by: STUDENT IN AN ORGANIZED HEALTH CARE EDUCATION/TRAINING PROGRAM

## 2023-11-03 PROCEDURE — 87040 BLOOD CULTURE FOR BACTERIA: CPT

## 2023-11-03 PROCEDURE — 83880 ASSAY OF NATRIURETIC PEPTIDE: CPT

## 2023-11-03 PROCEDURE — 85025 COMPLETE CBC W/AUTO DIFF WBC: CPT

## 2023-11-03 PROCEDURE — 99285 EMERGENCY DEPT VISIT HI MDM: CPT

## 2023-11-03 PROCEDURE — 80053 COMPREHEN METABOLIC PANEL: CPT

## 2023-11-03 PROCEDURE — 87804 INFLUENZA ASSAY W/OPTIC: CPT

## 2023-11-03 RX ORDER — ACETAMINOPHEN 325 MG/1
650 TABLET ORAL ONCE
Status: COMPLETED | OUTPATIENT
Start: 2023-11-03 | End: 2023-11-03

## 2023-11-03 RX ADMIN — ACETAMINOPHEN 650 MG: 325 TABLET ORAL at 21:23

## 2023-11-03 ASSESSMENT — LIFESTYLE VARIABLES
HOW OFTEN DO YOU HAVE A DRINK CONTAINING ALCOHOL: NEVER
HOW MANY STANDARD DRINKS CONTAINING ALCOHOL DO YOU HAVE ON A TYPICAL DAY: PATIENT DOES NOT DRINK

## 2023-11-03 ASSESSMENT — PAIN DESCRIPTION - LOCATION: LOCATION: HEAD

## 2023-11-03 NOTE — ED TRIAGE NOTES
Pt ambulated to triage with c/o weakness, fatigues, and dizziness. Pt reports he was hospitalized at [facility] last week for having fluid on his lungs and heart/ Pt also reports they found a nodule on his lungs and he is supposed to follow up with oncology. Pt tested positive for COVID at home today. Pt reports he was in the ICU with Conni Ganser in 2021. Pt is febrile. Pt is alert and oriented; respirations even and unlabored; vitals stable.

## 2023-11-04 ENCOUNTER — APPOINTMENT (OUTPATIENT)
Dept: CT IMAGING | Age: 88
DRG: 682 | End: 2023-11-04
Payer: MEDICARE

## 2023-11-04 LAB
BACTERIA URNS QL MICRO: ABNORMAL /HPF
BILIRUB UR QL STRIP.AUTO: NEGATIVE
CLARITY UR: CLEAR
COLOR UR: YELLOW
CREAT UR-MCNC: 37.5 MG/DL (ref 39–259)
EKG DIAGNOSIS: NORMAL
EKG Q-T INTERVAL: 424 MS
EKG QRS DURATION: 152 MS
EKG QTC CALCULATION (BAZETT): 457 MS
EKG R AXIS: 30 DEGREES
EKG T AXIS: 4 DEGREES
EKG VENTRICULAR RATE: 70 BPM
EOSINOPHIL URNS QL MICRO: NORMAL
EPI CELLS #/AREA URNS AUTO: 0 /HPF (ref 0–5)
GLUCOSE BLD-MCNC: 137 MG/DL (ref 70–99)
GLUCOSE BLD-MCNC: 169 MG/DL (ref 70–99)
GLUCOSE BLD-MCNC: 178 MG/DL (ref 70–99)
GLUCOSE BLD-MCNC: 218 MG/DL (ref 70–99)
GLUCOSE BLD-MCNC: 286 MG/DL (ref 70–99)
GLUCOSE UR STRIP.AUTO-MCNC: >=1000 MG/DL
HGB UR QL STRIP.AUTO: NEGATIVE
HYALINE CASTS #/AREA URNS AUTO: 0 /LPF (ref 0–8)
INR PPP: 2.05 (ref 0.84–1.16)
KETONES UR STRIP.AUTO-MCNC: NEGATIVE MG/DL
LEUKOCYTE ESTERASE UR QL STRIP.AUTO: NEGATIVE
NITRITE UR QL STRIP.AUTO: NEGATIVE
OSMOLALITY UR: 365 MOSM/KG (ref 390–1070)
PERFORMED ON: ABNORMAL
PH UR STRIP.AUTO: 5 [PH] (ref 5–8)
PROT UR STRIP.AUTO-MCNC: NEGATIVE MG/DL
PROTHROMBIN TIME: 23 SEC (ref 11.5–14.8)
RBC CLUMPS #/AREA URNS AUTO: 0 /HPF (ref 0–4)
SODIUM UR-SCNC: <20 MMOL/L
SP GR UR STRIP.AUTO: 1.02 (ref 1–1.03)
UA DIPSTICK W REFLEX MICRO PNL UR: ABNORMAL
URN SPEC COLLECT METH UR: ABNORMAL
UROBILINOGEN UR STRIP-ACNC: 0.2 E.U./DL
WBC #/AREA URNS AUTO: 0 /HPF (ref 0–5)

## 2023-11-04 PROCEDURE — 2580000003 HC RX 258: Performed by: STUDENT IN AN ORGANIZED HEALTH CARE EDUCATION/TRAINING PROGRAM

## 2023-11-04 PROCEDURE — 94640 AIRWAY INHALATION TREATMENT: CPT

## 2023-11-04 PROCEDURE — 6370000000 HC RX 637 (ALT 250 FOR IP): Performed by: STUDENT IN AN ORGANIZED HEALTH CARE EDUCATION/TRAINING PROGRAM

## 2023-11-04 PROCEDURE — 83935 ASSAY OF URINE OSMOLALITY: CPT

## 2023-11-04 PROCEDURE — 87205 SMEAR GRAM STAIN: CPT

## 2023-11-04 PROCEDURE — 6370000000 HC RX 637 (ALT 250 FOR IP): Performed by: PHYSICIAN ASSISTANT

## 2023-11-04 PROCEDURE — 1200000000 HC SEMI PRIVATE

## 2023-11-04 PROCEDURE — 82570 ASSAY OF URINE CREATININE: CPT

## 2023-11-04 PROCEDURE — 93010 ELECTROCARDIOGRAM REPORT: CPT | Performed by: INTERNAL MEDICINE

## 2023-11-04 PROCEDURE — 81001 URINALYSIS AUTO W/SCOPE: CPT

## 2023-11-04 PROCEDURE — 94760 N-INVAS EAR/PLS OXIMETRY 1: CPT

## 2023-11-04 PROCEDURE — 71250 CT THORAX DX C-: CPT

## 2023-11-04 PROCEDURE — 84300 ASSAY OF URINE SODIUM: CPT

## 2023-11-04 PROCEDURE — 6370000000 HC RX 637 (ALT 250 FOR IP): Performed by: INTERNAL MEDICINE

## 2023-11-04 PROCEDURE — 85610 PROTHROMBIN TIME: CPT

## 2023-11-04 RX ORDER — INSULIN LISPRO 100 [IU]/ML
0-4 INJECTION, SOLUTION INTRAVENOUS; SUBCUTANEOUS NIGHTLY
Status: DISCONTINUED | OUTPATIENT
Start: 2023-11-04 | End: 2023-11-05 | Stop reason: HOSPADM

## 2023-11-04 RX ORDER — ONDANSETRON 2 MG/ML
4 INJECTION INTRAMUSCULAR; INTRAVENOUS EVERY 6 HOURS PRN
Status: DISCONTINUED | OUTPATIENT
Start: 2023-11-04 | End: 2023-11-05 | Stop reason: HOSPADM

## 2023-11-04 RX ORDER — ACETAMINOPHEN 650 MG/1
650 SUPPOSITORY RECTAL EVERY 6 HOURS PRN
Status: DISCONTINUED | OUTPATIENT
Start: 2023-11-04 | End: 2023-11-05 | Stop reason: HOSPADM

## 2023-11-04 RX ORDER — ONDANSETRON 4 MG/1
4 TABLET, ORALLY DISINTEGRATING ORAL EVERY 8 HOURS PRN
Status: DISCONTINUED | OUTPATIENT
Start: 2023-11-04 | End: 2023-11-05 | Stop reason: HOSPADM

## 2023-11-04 RX ORDER — LEUCOVORIN/PYRIDOX/MECOBALAMIN 4-50-2 MG
1 TABLET ORAL DAILY
COMMUNITY
Start: 2023-10-03

## 2023-11-04 RX ORDER — SODIUM CHLORIDE 0.9 % (FLUSH) 0.9 %
5-40 SYRINGE (ML) INJECTION PRN
Status: DISCONTINUED | OUTPATIENT
Start: 2023-11-04 | End: 2023-11-05 | Stop reason: HOSPADM

## 2023-11-04 RX ORDER — SODIUM CHLORIDE, SODIUM LACTATE, POTASSIUM CHLORIDE, CALCIUM CHLORIDE 600; 310; 30; 20 MG/100ML; MG/100ML; MG/100ML; MG/100ML
INJECTION, SOLUTION INTRAVENOUS CONTINUOUS
Status: DISCONTINUED | OUTPATIENT
Start: 2023-11-04 | End: 2023-11-05

## 2023-11-04 RX ORDER — ACETAMINOPHEN 325 MG/1
650 TABLET ORAL EVERY 6 HOURS PRN
Status: DISCONTINUED | OUTPATIENT
Start: 2023-11-04 | End: 2023-11-05 | Stop reason: HOSPADM

## 2023-11-04 RX ORDER — DEXTROSE MONOHYDRATE 100 MG/ML
INJECTION, SOLUTION INTRAVENOUS CONTINUOUS PRN
Status: DISCONTINUED | OUTPATIENT
Start: 2023-11-04 | End: 2023-11-05 | Stop reason: HOSPADM

## 2023-11-04 RX ORDER — ASPIRIN 81 MG/1
324 TABLET, CHEWABLE ORAL ONCE
Status: COMPLETED | OUTPATIENT
Start: 2023-11-04 | End: 2023-11-04

## 2023-11-04 RX ORDER — WARFARIN SODIUM 7.5 MG/1
7.5 TABLET ORAL
Status: COMPLETED | OUTPATIENT
Start: 2023-11-04 | End: 2023-11-04

## 2023-11-04 RX ORDER — SODIUM CHLORIDE, SODIUM LACTATE, POTASSIUM CHLORIDE, CALCIUM CHLORIDE 600; 310; 30; 20 MG/100ML; MG/100ML; MG/100ML; MG/100ML
INJECTION, SOLUTION INTRAVENOUS CONTINUOUS
Status: DISCONTINUED | OUTPATIENT
Start: 2023-11-04 | End: 2023-11-04

## 2023-11-04 RX ORDER — METOPROLOL SUCCINATE 50 MG/1
100 TABLET, EXTENDED RELEASE ORAL DAILY
Status: DISCONTINUED | OUTPATIENT
Start: 2023-11-05 | End: 2023-11-05 | Stop reason: HOSPADM

## 2023-11-04 RX ORDER — ASPIRIN 81 MG/1
TABLET, CHEWABLE ORAL
Status: DISCONTINUED
Start: 2023-11-04 | End: 2023-11-04

## 2023-11-04 RX ORDER — SODIUM CHLORIDE 0.9 % (FLUSH) 0.9 %
5-40 SYRINGE (ML) INJECTION EVERY 12 HOURS SCHEDULED
Status: DISCONTINUED | OUTPATIENT
Start: 2023-11-04 | End: 2023-11-05 | Stop reason: HOSPADM

## 2023-11-04 RX ORDER — METOPROLOL SUCCINATE 50 MG/1
150 TABLET, EXTENDED RELEASE ORAL DAILY
Status: DISCONTINUED | OUTPATIENT
Start: 2023-11-04 | End: 2023-11-04

## 2023-11-04 RX ORDER — INSULIN LISPRO 100 [IU]/ML
0-4 INJECTION, SOLUTION INTRAVENOUS; SUBCUTANEOUS
Status: DISCONTINUED | OUTPATIENT
Start: 2023-11-04 | End: 2023-11-05 | Stop reason: HOSPADM

## 2023-11-04 RX ORDER — ALBUTEROL SULFATE 2.5 MG/3ML
2.5 SOLUTION RESPIRATORY (INHALATION) EVERY 6 HOURS PRN
Status: DISCONTINUED | OUTPATIENT
Start: 2023-11-04 | End: 2023-11-05 | Stop reason: HOSPADM

## 2023-11-04 RX ORDER — ATORVASTATIN CALCIUM 20 MG/1
20 TABLET, FILM COATED ORAL NIGHTLY
Status: DISCONTINUED | OUTPATIENT
Start: 2023-11-04 | End: 2023-11-05 | Stop reason: HOSPADM

## 2023-11-04 RX ORDER — SODIUM CHLORIDE 9 MG/ML
INJECTION, SOLUTION INTRAVENOUS PRN
Status: DISCONTINUED | OUTPATIENT
Start: 2023-11-04 | End: 2023-11-05 | Stop reason: HOSPADM

## 2023-11-04 RX ORDER — INSULIN GLARGINE 100 [IU]/ML
10 INJECTION, SOLUTION SUBCUTANEOUS NIGHTLY
Status: DISCONTINUED | OUTPATIENT
Start: 2023-11-04 | End: 2023-11-05 | Stop reason: HOSPADM

## 2023-11-04 RX ORDER — TAMSULOSIN HYDROCHLORIDE 0.4 MG/1
0.4 CAPSULE ORAL NIGHTLY
Status: DISCONTINUED | OUTPATIENT
Start: 2023-11-04 | End: 2023-11-05 | Stop reason: HOSPADM

## 2023-11-04 RX ORDER — TORSEMIDE 20 MG/1
20 TABLET ORAL 2 TIMES DAILY
Status: DISCONTINUED | OUTPATIENT
Start: 2023-11-04 | End: 2023-11-05 | Stop reason: HOSPADM

## 2023-11-04 RX ORDER — SPIRONOLACTONE 25 MG/1
25 TABLET ORAL DAILY
Status: ON HOLD | COMMUNITY
End: 2023-11-05 | Stop reason: HOSPADM

## 2023-11-04 RX ADMIN — WARFARIN SODIUM 7.5 MG: 7.5 TABLET ORAL at 17:33

## 2023-11-04 RX ADMIN — INSULIN GLARGINE 10 UNITS: 100 INJECTION, SOLUTION SUBCUTANEOUS at 23:04

## 2023-11-04 RX ADMIN — TAMSULOSIN HYDROCHLORIDE 0.4 MG: 0.4 CAPSULE ORAL at 23:05

## 2023-11-04 RX ADMIN — ASPIRIN 324 MG: 81 TABLET, CHEWABLE ORAL at 00:50

## 2023-11-04 RX ADMIN — MOMETASONE FUROATE AND FORMOTEROL FUMARATE DIHYDRATE 2 PUFF: 200; 5 AEROSOL RESPIRATORY (INHALATION) at 09:16

## 2023-11-04 RX ADMIN — ATORVASTATIN CALCIUM 20 MG: 20 TABLET, FILM COATED ORAL at 23:04

## 2023-11-04 RX ADMIN — INSULIN GLARGINE 10 UNITS: 100 INJECTION, SOLUTION SUBCUTANEOUS at 04:51

## 2023-11-04 RX ADMIN — INSULIN LISPRO 2 UNITS: 100 INJECTION, SOLUTION INTRAVENOUS; SUBCUTANEOUS at 12:27

## 2023-11-04 RX ADMIN — SODIUM CHLORIDE, POTASSIUM CHLORIDE, SODIUM LACTATE AND CALCIUM CHLORIDE: 600; 310; 30; 20 INJECTION, SOLUTION INTRAVENOUS at 04:50

## 2023-11-04 RX ADMIN — METOPROLOL SUCCINATE 150 MG: 50 TABLET, EXTENDED RELEASE ORAL at 08:37

## 2023-11-04 RX ADMIN — MOMETASONE FUROATE AND FORMOTEROL FUMARATE DIHYDRATE 2 PUFF: 200; 5 AEROSOL RESPIRATORY (INHALATION) at 20:13

## 2023-11-04 ASSESSMENT — ENCOUNTER SYMPTOMS
SHORTNESS OF BREATH: 1
DIARRHEA: 0
VOMITING: 0
COLOR CHANGE: 0
COUGH: 1

## 2023-11-04 ASSESSMENT — PAIN SCALES - GENERAL
PAINLEVEL_OUTOF10: 0
PAINLEVEL_OUTOF10: 0

## 2023-11-04 NOTE — ED NOTES
Report given to 4W RN to assume care. All questions answered, SBAR discussed, RN verbalized understanding.      Stephania Meraz RN  11/04/23 [id number]

## 2023-11-04 NOTE — H&P
V2.0  History and Physical      Name:  Estrella Lowry /Age/Sex: 1934  (80 y.o. male)   MRN & CSN:  [id number] & [id number] Encounter Date/Time: 2023 12:48 AM EDT   Location:  [id number] PCP: Faye Curtis       Assessment and Plan:   Estrella Lowry is a 80 y.o. male with chronic A-fib, long term use of coumadin, recently diagnosed lung adenocarcinoma, chronic diastolic heart failure, S2LU, hyperlipidemia is admitted due to fatigue and dizziness. NOREEN   Secondary to dehydration in the setting of active infection & hold Torsemide  FeNa 0.5%. UA no proteinuria/hematuria  Bladder scan  LR infusion and CK level  Recheck BMP     Sepsis secondary to COVID 19 infection  No evidence of viral pneumonia  No indication for decadron or anti viral   Conservative management  Follow up Bcx     Right lung pleural effusion, likely malignant   Newly diagnosed R. Lung Adenocarcinoma   IR for thoracentesis  Check CT chest   Outpatient follow up with Oncology    T2DM with hyperglycemia  Basal and sliding scale insulin   Hypoglycemia protocol    Chronic A-Fib  Long term use of anticoagulant  Toprol XL 150mg daily   Pharmacy to dose coumadin (INR for monitoring)    Chronic diastolic heart failure  Clinically hypovolemic   Hold Torsemide    BPH  Continue Flomax    Inpatient   Full Code    Disposition:   Current Living situation: Home   Expected Disposition: Home   Estimated D/C: 2-3 days    Diet ADULT DIET; Easy to Chew   DVT Prophylaxis [] Lovenox, []  Heparin, [] SCDs, [] Ambulation,  [] Eliquis, [] Xarelto, [x] Coumadin   Code Status Full Code   Surrogate Decision Maker/ ALANIS Roy     Personally reviewed Lab Studies and Imaging     History from:     Patient     History of Present Illness:     Chief Complaint: generalized weakness, fatigue and dizziness.     Estrella Lowry is a 80 y.o. male with chronic A-fib, long term use of coumadin, recently diagnosed lung adenocarcinoma, chronic diastolic heart

## 2023-11-04 NOTE — ED PROVIDER NOTES
[address] [facility] [address] [address]        Pt Name: Harinder Cade  MRN: [id number]  [address] [facility] 9/2/1934  Date of evaluation: 11/3/2023  Provider: REID Kapadia  PCP: Vida Lesch  Note Started: 2:04 AM EDT 11/4/23      ABILIO. I have evaluated this patient. CHIEF COMPLAINT       Chief Complaint   Patient presents with    Fatigue    Dizziness     Pt reports he is fatigued, dizzy, and weak. Pt reports he was in the hospital last weak and had fluid drained off his lungs. Pt has previosuly been hospitalized for COVID. Pt tested positive for COVID at home today. HISTORY OF PRESENT ILLNESS: 1 or more Elements     History from : Patient    Limitations to history : None    Harinder Cade is a 80 y.o. male who presents complaining of shortness of breath fatigue fever lightheadedness. Symptoms started yesterday morning. He was recently in the hospital at [facility] for about a week he tells me at that time he had fluid drained from his lung and he was diagnosed with possible lung cancer. He is currently being evaluated for this. He lives at home alone tells me that yesterday he developed symptoms and today he tested positive for COVID at home. He did not take anything for fever. He is felt increasingly short of breath. He has history of COPD, A-fib on warfarin and history of pneumonia. He denies chest pain. Nursing Notes were all reviewed and agreed with or any disagreements were addressed in the HPI. REVIEW OF SYSTEMS :      Review of Systems   Constitutional:  Positive for fatigue and fever. Respiratory:  Positive for cough and shortness of breath. Cardiovascular:  Negative for chest pain and leg swelling. Gastrointestinal:  Negative for diarrhea and vomiting. Skin:  Negative for color change and wound. Neurological:  Positive for weakness and light-headedness.    Psychiatric/Behavioral:  Negative for agitation,

## 2023-11-04 NOTE — ED PROVIDER NOTES
Pt Name: Carmine Sinha  MRN: [id number]  [address] 9/2/1934  Date of evaluation: 11/3/2023    EKG Interpretation    The purpose of this note is for preliminary EKG interpretation only. This patient was seen independently by the mid-level provider and was not seen by this provider. EKG visualized preliminarily interpreted by myself shows atrial fibrillation at a rate of 70 with an axis of 30. There is evidence of a ventricular paced beats. Those were intermittent. Really no significant change compared to previous.         Luís Leroy MD  11/04/23 0739

## 2023-11-05 VITALS
SYSTOLIC BLOOD PRESSURE: 144 MMHG | HEIGHT: 70 IN | HEART RATE: 76 BPM | BODY MASS INDEX: 34.5 KG/M2 | DIASTOLIC BLOOD PRESSURE: 66 MMHG | TEMPERATURE: 98.1 F | WEIGHT: 240.96 LBS | OXYGEN SATURATION: 96 % | RESPIRATION RATE: 18 BRPM

## 2023-11-05 LAB
ALBUMIN SERPL-MCNC: 3.5 G/DL (ref 3.4–5)
ALBUMIN/GLOB SERPL: 1.3 {RATIO} (ref 1.1–2.2)
ALP SERPL-CCNC: 83 U/L (ref 40–129)
ALT SERPL-CCNC: 27 U/L (ref 10–40)
ANION GAP SERPL CALCULATED.3IONS-SCNC: 10 MMOL/L (ref 3–16)
AST SERPL-CCNC: 22 U/L (ref 15–37)
BASOPHILS # BLD: 0.1 K/UL (ref 0–0.2)
BASOPHILS NFR BLD: 0.9 %
BILIRUB SERPL-MCNC: 0.8 MG/DL (ref 0–1)
BUN SERPL-MCNC: 37 MG/DL (ref 7–20)
CALCIUM SERPL-MCNC: 8.2 MG/DL (ref 8.3–10.6)
CHLORIDE SERPL-SCNC: 96 MMOL/L (ref 99–110)
CO2 SERPL-SCNC: 28 MMOL/L (ref 21–32)
CREAT SERPL-MCNC: 1.4 MG/DL (ref 0.8–1.3)
DEPRECATED RDW RBC AUTO: 17.7 % (ref 12.4–15.4)
EOSINOPHIL # BLD: 0.3 K/UL (ref 0–0.6)
EOSINOPHIL NFR BLD: 4 %
GFR SERPLBLD CREATININE-BSD FMLA CKD-EPI: 48 ML/MIN/{1.73_M2}
GLUCOSE BLD-MCNC: 149 MG/DL (ref 70–99)
GLUCOSE BLD-MCNC: 210 MG/DL (ref 70–99)
GLUCOSE SERPL-MCNC: 172 MG/DL (ref 70–99)
HCT VFR BLD AUTO: 38.5 % (ref 40.5–52.5)
HGB BLD-MCNC: 13.1 G/DL (ref 13.5–17.5)
INR PPP: 2.19 (ref 0.84–1.16)
LYMPHOCYTES # BLD: 0.7 K/UL (ref 1–5.1)
LYMPHOCYTES NFR BLD: 8.6 %
MAGNESIUM SERPL-MCNC: 3 MG/DL (ref 1.8–2.4)
MCH RBC QN AUTO: 27.3 PG (ref 26–34)
MCHC RBC AUTO-ENTMCNC: 34.2 G/DL (ref 31–36)
MCV RBC AUTO: 80 FL (ref 80–100)
MONOCYTES # BLD: 1.1 K/UL (ref 0–1.3)
MONOCYTES NFR BLD: 13.2 %
NEUTROPHILS # BLD: 6 K/UL (ref 1.7–7.7)
NEUTROPHILS NFR BLD: 73.3 %
PERFORMED ON: ABNORMAL
PERFORMED ON: ABNORMAL
PLATELET # BLD AUTO: 125 K/UL (ref 135–450)
PMV BLD AUTO: 8.5 FL (ref 5–10.5)
POTASSIUM SERPL-SCNC: 3.5 MMOL/L (ref 3.5–5.1)
PROT SERPL-MCNC: 6.2 G/DL (ref 6.4–8.2)
PROTHROMBIN TIME: 24.2 SEC (ref 11.5–14.8)
RBC # BLD AUTO: 4.8 M/UL (ref 4.2–5.9)
SODIUM SERPL-SCNC: 134 MMOL/L (ref 136–145)
WBC # BLD AUTO: 8.1 K/UL (ref 4–11)

## 2023-11-05 PROCEDURE — 6370000000 HC RX 637 (ALT 250 FOR IP): Performed by: STUDENT IN AN ORGANIZED HEALTH CARE EDUCATION/TRAINING PROGRAM

## 2023-11-05 PROCEDURE — 6370000000 HC RX 637 (ALT 250 FOR IP): Performed by: INTERNAL MEDICINE

## 2023-11-05 PROCEDURE — 9990000010 HC NO CHARGE VISIT

## 2023-11-05 PROCEDURE — 83735 ASSAY OF MAGNESIUM: CPT

## 2023-11-05 PROCEDURE — 94640 AIRWAY INHALATION TREATMENT: CPT

## 2023-11-05 PROCEDURE — 85025 COMPLETE CBC W/AUTO DIFF WBC: CPT

## 2023-11-05 PROCEDURE — 82550 ASSAY OF CK (CPK): CPT

## 2023-11-05 PROCEDURE — 94760 N-INVAS EAR/PLS OXIMETRY 1: CPT

## 2023-11-05 PROCEDURE — 80053 COMPREHEN METABOLIC PANEL: CPT

## 2023-11-05 PROCEDURE — 85610 PROTHROMBIN TIME: CPT

## 2023-11-05 RX ORDER — PREDNISONE 20 MG/1
20 TABLET ORAL DAILY
Qty: 4 TABLET | Refills: 0 | Status: SHIPPED | OUTPATIENT
Start: 2023-11-05 | End: 2023-11-09

## 2023-11-05 RX ORDER — POTASSIUM CHLORIDE 20 MEQ/1
40 TABLET, EXTENDED RELEASE ORAL ONCE
Status: DISCONTINUED | OUTPATIENT
Start: 2023-11-05 | End: 2023-11-05

## 2023-11-05 RX ORDER — PREDNISONE 20 MG/1
20 TABLET ORAL DAILY
Status: DISCONTINUED | OUTPATIENT
Start: 2023-11-05 | End: 2023-11-05 | Stop reason: HOSPADM

## 2023-11-05 RX ORDER — TORSEMIDE 20 MG/1
20 TABLET ORAL DAILY
Qty: 30 TABLET | Refills: 0
Start: 2023-11-05

## 2023-11-05 RX ORDER — WARFARIN SODIUM 7.5 MG/1
7.5 TABLET ORAL
Status: DISCONTINUED | OUTPATIENT
Start: 2023-11-05 | End: 2023-11-05 | Stop reason: HOSPADM

## 2023-11-05 RX ADMIN — METOPROLOL SUCCINATE 100 MG: 50 TABLET, EXTENDED RELEASE ORAL at 09:10

## 2023-11-05 RX ADMIN — INSULIN LISPRO 2 UNITS: 100 INJECTION, SOLUTION INTRAVENOUS; SUBCUTANEOUS at 15:25

## 2023-11-05 RX ADMIN — PREDNISONE 20 MG: 20 TABLET ORAL at 15:25

## 2023-11-05 RX ADMIN — MOMETASONE FUROATE AND FORMOTEROL FUMARATE DIHYDRATE 2 PUFF: 200; 5 AEROSOL RESPIRATORY (INHALATION) at 08:57

## 2023-11-05 NOTE — PLAN OF CARE
Problem: Discharge Planning  Goal: Discharge to home or other facility with appropriate resources  11/5/2023 0933 by Miguel Ángel Mcnair  Outcome: Progressing  11/5/2023 0021 by Theron Nettles RN  Outcome: Progressing     Problem: Pain  Goal: Verbalizes/displays adequate comfort level or baseline comfort level  11/5/2023 0933 by Miguel Ángel Mcnair  Outcome: Progressing  11/5/2023 0021 by Theron Nettles RN  Outcome: Progressing     Problem: Safety - Adult  Goal: Free from fall injury  11/5/2023 0933 by Miguel Ángel Mcnair  Outcome: Progressing  11/5/2023 0021 by Theron Nettles RN  Outcome: Progressing     Problem: ABCDS Injury Assessment  Goal: Absence of physical injury  11/5/2023 0933 by Miguel Ángel Mcnair  Outcome: Progressing  11/5/2023 0021 by Theron Nettles RN  Outcome: Progressing

## 2023-11-05 NOTE — CARE COORDINATION
Discharge Planning:      (CM) reviewed the patient's chart to assess needs. Patient's Readmission Risk Score is 14%  . Patient's medical insurance is  Payor: MEDICARE / Plan: [employer] MEDICARE / Product Type: *No Product type* / .  Patient's PCP is Mindy Robertson No needs anticipated, at this time. CM team to follow. Staff to inform CM if additional discharge needs arise. Pts preferred pharmacy is   [address] [place] [address] [id number] - [id number]  Phone: [phone] Fax: [phone]    SW spoke with pt who reported his son will transport him home at d/c. No d/c needs noted.    MICEHLLE De Anda  [phone]  Electronically signed by MICHELLE Elizabeth on 11/5/2023 at 10:55 AM Patient position prone on procedure table. Patient prepped and draped per unit standard.    Safety straps applied:Yes

## 2023-11-05 NOTE — ACP (ADVANCE CARE PLANNING)
Advance Care Planning     Advance Care Planning Activator (Inpatient)  Conversation Note      Date of ACP Conversation: 11/5/2023     Conversation Conducted with: Patient with Decision Making Capacity    ACP Activator: MICHELLE Rich      Health Care Decision Maker:     Current Designated Health Care Decision Maker:     Primary Decision Maker: Sarah Palacio Child - [phone]    Secondary Decision Maker: Kermit Hendrickson Child - [phone]    Care Preferences    Ventilation: \"If you were in your present state of health and suddenly became very ill and were unable to breathe on your own, what would your preference be about the use of a ventilator (breathing machine) if it were available to you? \"      Would the patient desire the use of ventilator (breathing machine)?: yes    \"If your health worsens and it becomes clear that your chance of recovery is unlikely, what would your preference be about the use of a ventilator (breathing machine) if it were available to you? \"     Would the patient desire the use of ventilator (breathing machine)?: No      Resuscitation  \"CPR works best to restart the heart when there is a sudden event, like a heart attack, in someone who is otherwise healthy. Unfortunately, CPR does not typically restart the heart for people who have serious health conditions or who are very sick. \"    \"In the event your heart stopped as a result of an underlying serious health condition, would you want attempts to be made to restart your heart (answer \"yes\" for attempt to resuscitate) or would you prefer a natural death (answer \"no\" for do not attempt to resuscitate)? \" yes       [] Yes   [x] No   Educated Patient / Jessika Harman regarding differences between Advance Directives and portable DNR orders.     Length of ACP Conversation in minutes:  5    Conversation Outcomes:  ACP discussion completed    Follow-up plan:    [] Schedule follow-up conversation to continue planning  [] Referred

## 2023-11-05 NOTE — PLAN OF CARE
Problem: Discharge Planning  Goal: Discharge to home or other facility with appropriate resources  11/5/2023 0021 by Kareem Stephens RN  Outcome: Progressing  11/4/2023 1415 by Lissette Mcallister RN  Outcome: Progressing  Flowsheets (Taken 11/4/2023 1480 by Slade Reis RN)  Discharge to home or other facility with appropriate resources:   Identify barriers to discharge with patient and caregiver   Arrange for needed discharge resources and transportation as appropriate

## 2023-11-05 NOTE — PLAN OF CARE
Problem: Discharge Planning  Goal: Discharge to home or other facility with appropriate resources  11/5/2023 1130 by [place]  Outcome: Completed  11/5/2023 0933 by [place]  Outcome: Progressing  11/5/2023 0021 by Berhane Thomason RN  Outcome: Progressing     Problem: Pain  Goal: Verbalizes/displays adequate comfort level or baseline comfort level  11/5/2023 1130 by [place]  Outcome: Completed  11/5/2023 0933 by [place]  Outcome: Progressing  11/5/2023 0021 by Berhane Thomason RN  Outcome: Progressing     Problem: Safety - Adult  Goal: Free from fall injury  11/5/2023 1130 by [place]  Outcome: Completed  11/5/2023 0933 by [place]  Outcome: Progressing  11/5/2023 0021 by Berhane Thomason RN  Outcome: Progressing     Problem: ABCDS Injury Assessment  Goal: Absence of physical injury  11/5/2023 1130 by [place]  Outcome: Completed  11/5/2023 0933 by [place]  Outcome: Progressing  11/5/2023 0021 by Berhane Thomason RN  Outcome: Progressing

## 2023-11-06 LAB — CK SERPL-CCNC: 42 U/L (ref 39–308)

## 2023-11-07 LAB
BACTERIA BLD CULT ORG #2: NORMAL
BACTERIA BLD CULT: NORMAL

## 2024-04-27 ENCOUNTER — APPOINTMENT (OUTPATIENT)
Dept: GENERAL RADIOLOGY | Age: 89
End: 2024-04-27
Payer: MEDICARE

## 2024-04-27 ENCOUNTER — HOSPITAL ENCOUNTER (EMERGENCY)
Age: 89
Discharge: HOME | End: 2024-04-27
Attending: STUDENT IN AN ORGANIZED HEALTH CARE EDUCATION/TRAINING PROGRAM
Payer: MEDICARE

## 2024-04-27 VITALS
HEART RATE: 86 BPM | WEIGHT: 231.48 LBS | SYSTOLIC BLOOD PRESSURE: 120 MMHG | BODY MASS INDEX: 32.41 KG/M2 | RESPIRATION RATE: 29 BRPM | HEIGHT: 71 IN | TEMPERATURE: 98 F | OXYGEN SATURATION: 92 % | DIASTOLIC BLOOD PRESSURE: 67 MMHG

## 2024-04-27 DIAGNOSIS — T50.901A ACCIDENTAL OVERDOSE, INITIAL ENCOUNTER: ICD-10-CM

## 2024-04-27 DIAGNOSIS — J18.9 PNEUMONIA DUE TO INFECTIOUS ORGANISM, UNSPECIFIED LATERALITY, UNSPECIFIED PART OF LUNG: Primary | ICD-10-CM

## 2024-04-27 LAB
ANION GAP SERPL CALCULATED.3IONS-SCNC: 15 MMOL/L (ref 3–16)
BASOPHILS # BLD: 0.1 K/UL (ref 0–0.2)
BASOPHILS NFR BLD: 1 %
BUN SERPL-MCNC: 37 MG/DL (ref 7–20)
CALCIUM SERPL-MCNC: 8.9 MG/DL (ref 8.3–10.6)
CHLORIDE SERPL-SCNC: 96 MMOL/L (ref 99–110)
CO2 SERPL-SCNC: 24 MMOL/L (ref 21–32)
CREAT SERPL-MCNC: 1.5 MG/DL (ref 0.8–1.3)
DEPRECATED RDW RBC AUTO: 20.6 % (ref 12.4–15.4)
EOSINOPHIL # BLD: 0.1 K/UL (ref 0–0.6)
EOSINOPHIL NFR BLD: 1.1 %
GFR SERPLBLD CREATININE-BSD FMLA CKD-EPI: 44 ML/MIN/{1.73_M2}
GLUCOSE SERPL-MCNC: 203 MG/DL (ref 70–99)
HCT VFR BLD AUTO: 41.6 % (ref 40.5–52.5)
HGB BLD-MCNC: 13.2 G/DL (ref 13.5–17.5)
INR PPP: 2.51 (ref 0.85–1.15)
LYMPHOCYTES # BLD: 0.8 K/UL (ref 1–5.1)
LYMPHOCYTES NFR BLD: 9.3 %
MCH RBC QN AUTO: 23.4 PG (ref 26–34)
MCHC RBC AUTO-ENTMCNC: 31.8 G/DL (ref 31–36)
MCV RBC AUTO: 73.6 FL (ref 80–100)
MONOCYTES # BLD: 0.9 K/UL (ref 0–1.3)
MONOCYTES NFR BLD: 10.2 %
NEUTROPHILS # BLD: 7.1 K/UL (ref 1.7–7.7)
NEUTROPHILS NFR BLD: 78.4 %
PLATELET # BLD AUTO: 199 K/UL (ref 135–450)
PMV BLD AUTO: 7.9 FL (ref 5–10.5)
POTASSIUM SERPL-SCNC: 3.9 MMOL/L (ref 3.5–5.1)
PROTHROMBIN TIME: 27 SEC (ref 11.9–14.9)
RBC # BLD AUTO: 5.64 M/UL (ref 4.2–5.9)
SODIUM SERPL-SCNC: 135 MMOL/L (ref 136–145)
WBC # BLD AUTO: 9 K/UL (ref 4–11)

## 2024-04-27 PROCEDURE — 85610 PROTHROMBIN TIME: CPT

## 2024-04-27 PROCEDURE — 99285 EMERGENCY DEPT VISIT HI MDM: CPT

## 2024-04-27 PROCEDURE — 6370000000 HC RX 637 (ALT 250 FOR IP): Performed by: STUDENT IN AN ORGANIZED HEALTH CARE EDUCATION/TRAINING PROGRAM

## 2024-04-27 PROCEDURE — 6360000002 HC RX W HCPCS: Performed by: STUDENT IN AN ORGANIZED HEALTH CARE EDUCATION/TRAINING PROGRAM

## 2024-04-27 PROCEDURE — 85025 COMPLETE CBC W/AUTO DIFF WBC: CPT

## 2024-04-27 PROCEDURE — 93005 ELECTROCARDIOGRAM TRACING: CPT | Performed by: STUDENT IN AN ORGANIZED HEALTH CARE EDUCATION/TRAINING PROGRAM

## 2024-04-27 PROCEDURE — 80048 BASIC METABOLIC PNL TOTAL CA: CPT

## 2024-04-27 PROCEDURE — 71046 X-RAY EXAM CHEST 2 VIEWS: CPT

## 2024-04-27 PROCEDURE — 96374 THER/PROPH/DIAG INJ IV PUSH: CPT

## 2024-04-27 RX ORDER — ONDANSETRON 2 MG/ML
4 INJECTION INTRAMUSCULAR; INTRAVENOUS ONCE
Status: COMPLETED | OUTPATIENT
Start: 2024-04-27 | End: 2024-04-27

## 2024-04-27 RX ORDER — DOXYCYCLINE HYCLATE 100 MG
100 TABLET ORAL 2 TIMES DAILY
Qty: 14 TABLET | Refills: 0 | Status: SHIPPED | OUTPATIENT
Start: 2024-04-27 | End: 2024-05-04

## 2024-04-27 RX ORDER — BENZONATATE 100 MG/1
100-200 CAPSULE ORAL 3 TIMES DAILY PRN
Qty: 42 CAPSULE | Refills: 0 | Status: SHIPPED | OUTPATIENT
Start: 2024-04-27 | End: 2024-05-04

## 2024-04-27 RX ORDER — BENZONATATE 100 MG/1
100 CAPSULE ORAL ONCE
Status: COMPLETED | OUTPATIENT
Start: 2024-04-27 | End: 2024-04-27

## 2024-04-27 RX ADMIN — POISON ADSORBENT 50 G: 50 SUSPENSION ORAL at 22:28

## 2024-04-27 RX ADMIN — ONDANSETRON 4 MG: 2 INJECTION INTRAMUSCULAR; INTRAVENOUS at 22:47

## 2024-04-27 RX ADMIN — BENZONATATE 100 MG: 100 CAPSULE ORAL at 22:47

## 2024-04-27 ASSESSMENT — PAIN - FUNCTIONAL ASSESSMENT: PAIN_FUNCTIONAL_ASSESSMENT: 0-10

## 2024-04-27 ASSESSMENT — PAIN SCALES - GENERAL: PAINLEVEL_OUTOF10: 7

## 2024-04-28 LAB
EKG DIAGNOSIS: NORMAL
EKG Q-T INTERVAL: 432 MS
EKG QRS DURATION: 148 MS
EKG QTC CALCULATION (BAZETT): 501 MS
EKG R AXIS: 56 DEGREES
EKG T AXIS: 6 DEGREES
EKG VENTRICULAR RATE: 81 BPM

## 2024-04-28 PROCEDURE — 93010 ELECTROCARDIOGRAM REPORT: CPT | Performed by: INTERNAL MEDICINE

## 2024-04-28 NOTE — DISCHARGE INSTRUCTIONS
Take your medications as prescribed except for Coumadin hold off on taking your Coumadin for the next 7 days.  Check your INR levels daily.  Return to the nearest emergency room if you develop bleeding or for levels greater than 10

## 2024-04-28 NOTE — ED NOTES
D/C: Order noted for d/c. Pt confirmed d/c paperwork  have correct name. Discharge and education instructions reviewed with patient. Teach-back successful.  Pt verbalized understanding. Pt denied questions at this time. No acute distress noted. Patient instructed to follow-up as noted - return to emergency department if symptoms worsen. Patient verbalized understanding. Discharged per EDMD with discharge instructions. Pt discharged  to private vehicle. Patient stable upon departure. Thanked patient for choosing [facility] for care.

## 2024-04-28 NOTE — ED TRIAGE NOTES
.Raul Byrd is a 89 y.o. male brought himself to the ER for eval of SOB and accidentally drug overdose. The patient states that he has stage 4 lung cancer and feels that the fluid in his chest in building up and that his shortness of breath got worse the last two days. The patient also states that he accidentally took 8 extra warfarin when he grabbed the wrong medicine bottle. The patient is alert and oriented with an open and patent airway with an increased respiratory effort.

## 2024-04-28 NOTE — ED PROVIDER NOTES
[facility] EMERGENCY DEPARTMENT      EMERGENCY MEDICINE     Pt Name: Raul Byrd  MRN: [id number]  Birthdate 9/2/1934  Date of evaluation: 4/27/2024  Provider: Silver Chan MD    CHIEF COMPLAINT       Chief Complaint   Patient presents with    Shortness of Breath     Patient states that he has stage 4 lung cancer, and feels that he has got a lot of fluid on his lungs     Drug Overdose     Patient states that he mixed up his med bottles and took about 8 extra warfarin about 2 hours ago      HISTORY OF PRESENT ILLNESS   Raul Byrd is a 89 y.o. male who presents to the emergency department for accidental overdose of his Coumadin.  He kept him in the pill bottle that was exact same pill ball as his nighttime medications accidentally took the wrong bottle.  He states there is maybe 10 to 15 pills of his 7.5 mg of Coumadin which he is on for atrial fibrillation..  Then he took his nighttime pill bottle that he thought he took which also had Coumadin and some home.  He has no bleeding at this time he mentions worsening of his cough it is a stage IV lung cancer and is in hospice care at this time    PASTMEDICAL HISTORY     Past Medical History:   Diagnosis Date    Atrial fibrillation (HCC)     Cancer (HCC)     CHF (congestive heart failure) (HCC)     COPD (chronic obstructive pulmonary disease) (HCC)     Diabetes (HCC)     Melanoma of skin, site unspecified     Malignant melanoma    Pain in back     Pulmonary emboli (HCC)        Patient Active Problem List   Diagnosis Code    COPD exacerbation ([facility]) J44.1    Chest pain R07.9    Acute on chronic diastolic CHF (congestive heart failure) ([facility]) I50.33    Exertional dyspnea R06.09    Atrial fibrillation, chronic ([facility]) I48.20    Dyspnea R06.00    Pneumonia due to COVID-19 virus U07.1, J12.82    Abnormal CXR R93.89    Acute respiratory failure with hypoxia ([facility]) J96.01    Multifocal pneumonia J18.9    BMI 35.0-35.9,adult Z68.35    NOREEN